# Patient Record
Sex: FEMALE | Employment: FULL TIME | URBAN - METROPOLITAN AREA
[De-identification: names, ages, dates, MRNs, and addresses within clinical notes are randomized per-mention and may not be internally consistent; named-entity substitution may affect disease eponyms.]

---

## 2019-10-05 ENCOUNTER — OFFICE VISIT (OUTPATIENT)
Dept: URGENT CARE | Facility: CLINIC | Age: 59
End: 2019-10-05
Payer: COMMERCIAL

## 2019-10-05 VITALS
WEIGHT: 136.69 LBS | TEMPERATURE: 98.6 F | DIASTOLIC BLOOD PRESSURE: 62 MMHG | SYSTOLIC BLOOD PRESSURE: 136 MMHG | HEIGHT: 60 IN | HEART RATE: 87 BPM | BODY MASS INDEX: 26.84 KG/M2 | OXYGEN SATURATION: 97 % | RESPIRATION RATE: 16 BRPM

## 2019-10-05 DIAGNOSIS — J06.9 VIRAL UPPER RESPIRATORY TRACT INFECTION: Primary | ICD-10-CM

## 2019-10-05 LAB — S PYO AG THROAT QL: NEGATIVE

## 2019-10-05 PROCEDURE — 99203 OFFICE O/P NEW LOW 30 MIN: CPT | Performed by: EMERGENCY MEDICINE

## 2019-10-05 PROCEDURE — 87880 STREP A ASSAY W/OPTIC: CPT | Performed by: EMERGENCY MEDICINE

## 2019-10-05 RX ORDER — PREDNISONE 10 MG/1
TABLET ORAL
Qty: 27 TABLET | Refills: 0 | Status: SHIPPED | OUTPATIENT
Start: 2019-10-05

## 2019-10-05 NOTE — PROGRESS NOTES
[facility]        NAME: David Macedo is a 61 y o  female  : 1960    MRN: [id number]  DATE: 2019  TIME: 10:32 AM    Assessment and Plan   Viral upper respiratory tract infection [J06 9]  1  Viral upper respiratory tract infection  POCT rapid strepA    predniSONE 10 mg tablet         Patient Instructions     Patient Instructions       You have been diagnosed with a Viral Upper Respiratory infection and your symptoms should resolve over the next 7 to 10 days with the treatments recommended today  If they do not, it is possible that you have developed a bacterial infection and you should return  If you were to take an antibiotic while you are still in the viral stage, you will not get better any faster, but could kill off the good germs in your body as well as make the germs in you resistant to the antibiotic  Take an expectorant - guaifenesin should be the only ingredient - during the day, and the cough suppressant (ex  Robitussin DM or Tessalon) if needed at night only  Take Zinc 12 5 to 15 mg every 2 - 3 hrs while awake for the next few days  You may take Cold [place] (13 3 mg of Zinc) or split a 25 mg Zinc tablet or lozenge in two or a 50 mg into four to get the proper dose  The total daily dose of Zinc should exceed 75 mg per day  You may also take a decongestant like Sudafed, unless you have hypertension or cardiac disease  Hold any NSAIDs like Ibuprofen (Advil), Naprosyn (Aleve), etc while on steroids like Medrol or Prednisone  If you are diabetic, you should also adhere strictly to your diet and monitor your blood sugar closely while on the steroids as discussed  Upper Respiratory Infection   AMBULATORY CARE:   An upper respiratory infection  is also called a common cold  It can affect your nose, throat, ears, and sinuses  Common signs and symptoms include the following:  Cold symptoms are usually worst for the first 3 to 5 days   You may have any of the following:  · Runny or stuffy nose    · Sneezing and coughing    · Sore throat or hoarseness    · Red, watery, and sore eyes    · Fatigue     · Chills and fever    · Headache, body aches, or sore muscles  Seek care immediately if:   · You have chest pain or trouble breathing  Contact your healthcare provider if:   · You have a fever over 102ºF (39°C)  · Your sore throat gets worse or you see white or yellow spots in your throat  · Your symptoms get worse after 3 to 5 days or your cold is not better in 14 days  · You have a rash anywhere on your skin  · You have large, tender lumps in your neck  · You have thick, green or yellow drainage from your nose  · You cough up thick yellow, green, or bloody mucus  · You have vomiting for more than 24 hours and cannot keep fluids down  · You have a bad earache  · You have questions or concerns about your condition or care  Treatment for a cold: There is no cure for the common cold  Colds are caused by viruses and do not get better with antibiotics  Most people get better in 7 to 14 days  You may continue to cough for 2 to 3 weeks  The following may help decrease your symptoms:  · Decongestants  help reduce nasal congestion and help you breathe more easily  If you take decongestant pills, they may make you feel restless or not able to sleep  Do not use decongestant sprays for more than a few days  · Cough suppressants  help reduce coughing  Ask your healthcare provider which type of cough medicine is best for you  · NSAIDs , such as ibuprofen, help decrease swelling, pain, and fever  NSAIDs can cause stomach bleeding or kidney problems in certain people  If you take blood thinner medicine, always ask your healthcare provider if NSAIDs are safe for you  Always read the medicine label and follow directions  · Acetaminophen  decreases pain and fever  It is available without a doctor's order  Ask how much to take and how often to take it  Follow directions  Read the labels of all other medicines you are using to see if they also contain acetaminophen, or ask your doctor or pharmacist  Acetaminophen can cause liver damage if not taken correctly  Do not use more than 4 grams (4,000 milligrams) total of acetaminophen in one day  Manage your cold:   · Rest as much as possible  Slowly start to do more each day  · Drink more liquids as directed  Liquids will help thin and loosen mucus so you can cough it up  Liquids will also help prevent dehydration  Liquids that help prevent dehydration include water, fruit juice, and broth  Do not drink liquids that contain caffeine  Caffeine can increase your risk for dehydration  Ask your healthcare provider how much liquid to drink each day  · Soothe a sore throat  Gargle with warm salt water  This helps your sore throat feel better  Make salt water by dissolving ¼ teaspoon salt in 1 cup warm water  You may also suck on hard candy or throat lozenges  You may use a sore throat spray  · Use a humidifier or vaporizer  Use a cool mist humidifier or a vaporizer to increase air moisture in your home  This may make it easier for you to breathe and help decrease your cough  · Use saline nasal drops as directed  These help relieve congestion  · Apply petroleum-based jelly around the outside of your nostrils  This can decrease irritation from blowing your nose  · Do not smoke  Nicotine and other chemicals in cigarettes and cigars can make your symptoms worse  They can also cause infections such as bronchitis or pneumonia  Ask your healthcare provider for information if you currently smoke and need help to quit  E-cigarettes or smokeless tobacco still contain nicotine  Talk to your healthcare provider before you use these products  Prevent spreading your cold to others:   · Try to stay away from other people during the first 2 to 3 days of your cold when it is more easily spread       · Do not share food or drinks  · Do not share hand towels with household members  · Wash your hands often, especially after you blow your nose  Turn away from other people and cover your mouth and nose with a tissue when you sneeze or cough  Follow up with your healthcare provider as directed:  Write down your questions so you remember to ask them during your visits  © 2017 [employer] [employer] Information is for End User's use only and may not be sold, redistributed or otherwise used for commercial purposes  All illustrations and images included in CareNotes® are the copyrighted property of [employer]  or [employer]  The above information is an  only  It is not intended as medical advice for individual conditions or treatments  Talk to your doctor, nurse or pharmacist before following any medical regimen to see if it is safe and effective for you  Follow up with PCP in 3-5 days  Proceed to  ER if symptoms worsen  Chief Complaint     Chief Complaint   Patient presents with    Sore Throat     c/o sore throat approx 1 week, now with head and chest congestin with cough  History of Present Illness       Patient with sore throat, cough and congestion for past 1 week  Review of Systems   Review of Systems   Constitutional: Negative for chills and fever  HENT: Positive for congestion, rhinorrhea, sinus pressure and sore throat  Negative for trouble swallowing and voice change  Respiratory: Positive for cough  Negative for chest tightness, shortness of breath and wheezing  Cardiovascular: Negative for chest pain           Current Medications       Current Outpatient Medications:     predniSONE 10 mg tablet, Take once daily all days pills on this schedule 6- 6- 5- 4- 3- 2- 1, Disp: 27 tablet, Rfl: 0    Current Allergies     Allergies as of 10/05/2019 - Reviewed 10/05/2019   Allergen Reaction Noted    Bentyl [dicyclomine] Swelling 10/05/2019 The following portions of the patient's history were reviewed and updated as appropriate: allergies, current medications, past family history, past medical history, past social history, past surgical history and problem list      Past Medical History:   Diagnosis Date    Known health problems: none        Past Surgical History:   Procedure Laterality Date    CHOLECYSTECTOMY      SKIN CANCER EXCISION      TONSILLECTOMY      TOTAL HIP ARTHROPLASTY         History reviewed  No pertinent family history  Medications have been verified  Objective   /62   Pulse 87   Temp 98 6 °F (37 °C) (Tympanic)   Resp 16   Ht 5' (1 524 m)   Wt 62 kg (136 lb 11 oz)   SpO2 97%   BMI 26 69 kg/m²        Physical Exam     Physical Exam   Constitutional: She is oriented to person, place, and time  She appears well-developed and well-nourished  No distress  HENT:   Head: Normocephalic and atraumatic  Right Ear: Tympanic membrane and external ear normal    Left Ear: Tympanic membrane and external ear normal    Nose: Mucosal edema present  Mouth/Throat: Posterior oropharyngeal erythema present  No oropharyngeal exudate or tonsillar abscesses  Nasal mucosa congested, oropharynx mildly erythematous  Neck: Neck supple  Cardiovascular: Normal rate and regular rhythm  Pulmonary/Chest: Effort normal  No respiratory distress  She has no wheezes  She has no rales  Neurological: She is alert and oriented to person, place, and time  Skin: Skin is warm and dry  Psychiatric: She has a normal mood and affect  Her behavior is normal  Judgment and thought content normal    Nursing note and vitals reviewed  5

## 2019-10-05 NOTE — PATIENT INSTRUCTIONS
You have been diagnosed with a Viral Upper Respiratory infection and your symptoms should resolve over the next 7 to 10 days with the treatments recommended today  If they do not, it is possible that you have developed a bacterial infection and you should return  If you were to take an antibiotic while you are still in the viral stage, you will not get better any faster, but could kill off the good germs in your body as well as make the germs in you resistant to the antibiotic  Take an expectorant - guaifenesin should be the only ingredient - during the day, and the cough suppressant (ex  Robitussin DM or Tessalon) if needed at night only  Take Zinc 12 5 to 15 mg every 2 - 3 hrs while awake for the next few days  You may take Cold [place] (13 3 mg of Zinc) or split a 25 mg Zinc tablet or lozenge in two or a 50 mg into four to get the proper dose  The total daily dose of Zinc should exceed 75 mg per day  You may also take a decongestant like Sudafed, unless you have hypertension or cardiac disease  Hold any NSAIDs like Ibuprofen (Advil), Naprosyn (Aleve), etc while on steroids like Medrol or Prednisone  If you are diabetic, you should also adhere strictly to your diet and monitor your blood sugar closely while on the steroids as discussed  Upper Respiratory Infection   AMBULATORY CARE:   An upper respiratory infection  is also called a common cold  It can affect your nose, throat, ears, and sinuses  Common signs and symptoms include the following:  Cold symptoms are usually worst for the first 3 to 5 days  You may have any of the following:  · Runny or stuffy nose    · Sneezing and coughing    · Sore throat or hoarseness    · Red, watery, and sore eyes    · Fatigue     · Chills and fever    · Headache, body aches, or sore muscles  Seek care immediately if:   · You have chest pain or trouble breathing  Contact your healthcare provider if:   · You have a fever over 102ºF (39°C)      · Your sore throat gets worse or you see white or yellow spots in your throat  · Your symptoms get worse after 3 to 5 days or your cold is not better in 14 days  · You have a rash anywhere on your skin  · You have large, tender lumps in your neck  · You have thick, green or yellow drainage from your nose  · You cough up thick yellow, green, or bloody mucus  · You have vomiting for more than 24 hours and cannot keep fluids down  · You have a bad earache  · You have questions or concerns about your condition or care  Treatment for a cold: There is no cure for the common cold  Colds are caused by viruses and do not get better with antibiotics  Most people get better in 7 to 14 days  You may continue to cough for 2 to 3 weeks  The following may help decrease your symptoms:  · Decongestants  help reduce nasal congestion and help you breathe more easily  If you take decongestant pills, they may make you feel restless or not able to sleep  Do not use decongestant sprays for more than a few days  · Cough suppressants  help reduce coughing  Ask your healthcare provider which type of cough medicine is best for you  · NSAIDs , such as ibuprofen, help decrease swelling, pain, and fever  NSAIDs can cause stomach bleeding or kidney problems in certain people  If you take blood thinner medicine, always ask your healthcare provider if NSAIDs are safe for you  Always read the medicine label and follow directions  · Acetaminophen  decreases pain and fever  It is available without a doctor's order  Ask how much to take and how often to take it  Follow directions  Read the labels of all other medicines you are using to see if they also contain acetaminophen, or ask your doctor or pharmacist  Acetaminophen can cause liver damage if not taken correctly  Do not use more than 4 grams (4,000 milligrams) total of acetaminophen in one day  Manage your cold:   · Rest as much as possible  Slowly start to do more each day  · Drink more liquids as directed  Liquids will help thin and loosen mucus so you can cough it up  Liquids will also help prevent dehydration  Liquids that help prevent dehydration include water, fruit juice, and broth  Do not drink liquids that contain caffeine  Caffeine can increase your risk for dehydration  Ask your healthcare provider how much liquid to drink each day  · Soothe a sore throat  Gargle with warm salt water  This helps your sore throat feel better  Make salt water by dissolving ¼ teaspoon salt in 1 cup warm water  You may also suck on hard candy or throat lozenges  You may use a sore throat spray  · Use a humidifier or vaporizer  Use a cool mist humidifier or a vaporizer to increase air moisture in your home  This may make it easier for you to breathe and help decrease your cough  · Use saline nasal drops as directed  These help relieve congestion  · Apply petroleum-based jelly around the outside of your nostrils  This can decrease irritation from blowing your nose  · Do not smoke  Nicotine and other chemicals in cigarettes and cigars can make your symptoms worse  They can also cause infections such as bronchitis or pneumonia  Ask your healthcare provider for information if you currently smoke and need help to quit  E-cigarettes or smokeless tobacco still contain nicotine  Talk to your healthcare provider before you use these products  Prevent spreading your cold to others:   · Try to stay away from other people during the first 2 to 3 days of your cold when it is more easily spread  · Do not share food or drinks  · Do not share hand towels with household members  · Wash your hands often, especially after you blow your nose  Turn away from other people and cover your mouth and nose with a tissue when you sneeze or cough  Follow up with your healthcare provider as directed:  Write down your questions so you remember to ask them during your visits     © 2017 [facility] Information is for End User's use only and may not be sold, redistributed or otherwise used for commercial purposes  All illustrations and images included in CareNotes® are the copyrighted property of [employer]  or [employer]  The above information is an  only  It is not intended as medical advice for individual conditions or treatments  Talk to your doctor, nurse or pharmacist before following any medical regimen to see if it is safe and effective for you

## 2021-04-13 DIAGNOSIS — Z23 ENCOUNTER FOR IMMUNIZATION: ICD-10-CM

## 2024-04-10 ENCOUNTER — TELEPHONE (OUTPATIENT)
Age: 64
End: 2024-04-10

## 2024-04-10 RX ORDER — NAPROXEN SODIUM 220 MG
220 TABLET ORAL
COMMUNITY
End: 2024-04-11

## 2024-04-10 RX ORDER — IBUPROFEN 200 MG
TABLET ORAL
COMMUNITY

## 2024-04-10 NOTE — TELEPHONE ENCOUNTER
Caller: elio Gregorio    Doctor:     Reason for call: pt is scheduled for an OV on 4/15 pt insurance needs to be updated  [employer]  ID# [id number]    Call back#: [phone]

## 2024-04-15 ENCOUNTER — CONSULT (OUTPATIENT)
Dept: PAIN MEDICINE | Facility: CLINIC | Age: 64
End: 2024-04-15
Payer: COMMERCIAL

## 2024-04-15 ENCOUNTER — HOSPITAL ENCOUNTER (OUTPATIENT)
Dept: RADIOLOGY | Facility: CLINIC | Age: 64
Discharge: HOME | End: 2024-04-15
Payer: COMMERCIAL

## 2024-04-15 VITALS
TEMPERATURE: 97.8 F | HEART RATE: 87 BPM | DIASTOLIC BLOOD PRESSURE: 84 MMHG | HEIGHT: 60 IN | WEIGHT: 137 LBS | RESPIRATION RATE: 18 BRPM | OXYGEN SATURATION: 100 % | SYSTOLIC BLOOD PRESSURE: 138 MMHG | BODY MASS INDEX: 26.9 KG/M2

## 2024-04-15 DIAGNOSIS — M54.12 CERVICAL RADICULOPATHY: ICD-10-CM

## 2024-04-15 DIAGNOSIS — M47.816 LUMBAR SPONDYLOSIS: ICD-10-CM

## 2024-04-15 DIAGNOSIS — M51.16 LUMBAR DISC DISEASE WITH RADICULOPATHY: Primary | ICD-10-CM

## 2024-04-15 DIAGNOSIS — M54.16 LUMBAR RADICULOPATHY: ICD-10-CM

## 2024-04-15 DIAGNOSIS — M51.16 LUMBAR DISC DISEASE WITH RADICULOPATHY: ICD-10-CM

## 2024-04-15 PROCEDURE — 99205 OFFICE O/P NEW HI 60 MIN: CPT | Performed by: ANESTHESIOLOGY

## 2024-04-15 PROCEDURE — 72040 X-RAY EXAM NECK SPINE 2-3 VW: CPT

## 2024-04-15 PROCEDURE — 72100 X-RAY EXAM L-S SPINE 2/3 VWS: CPT

## 2024-04-15 RX ORDER — GABAPENTIN 300 MG/1
300 CAPSULE ORAL 3 TIMES DAILY
Qty: 90 CAPSULE | Refills: 2 | Status: SHIPPED | OUTPATIENT
Start: 2024-04-15

## 2024-04-15 NOTE — PROGRESS NOTES
Assessment  1. Lumbar disc disease with radiculopathy    2. Lumbar radiculopathy    3. Lumbar spondylosis    Left-sided lumbar radicular pain in the L3, L4  dermatomal distribution accompanied by pain limited weakness numbness and paresthesias.  Patient has not yet participated with PT. Chronic pain with decreased participation with IADLs over the past year.  Has been taking OTC ibuprofen and tylenol with modest benefit.  5/5 strength bilaterally, positive SLR left-sided. Reflexes 2+.  Additionally there is positive facet loading, left greater than right. Denies any gait instability, saddle anesthesia. No pertinent imaging available to review at this time.  Risks, benefits alternatives epidural steroid injections thoroughly discussed with patient.  Handouts provided questions answered to patient's satisfaction.  Lifestyle modifications extensively discussed including diet, exercise and weight loss in addition to core strengthening.  Will proceed with multimodal pain therapy plan as noted below:    Left sided cervical radicular pain in C6 and C7 dermatomal distribution accompanied by pain limited weakness numbness and paresthesias.  Patient has not been participant with PT. Chronic pain with decreased participation with IADLs over the past few years.  Has been taking NSAIDs and tramadol infrequently with modest benefit.  5/5 strength bilaterally in upper extremities with AROM, positive spurling's maneuver left sided.  Additionally there is positive cervical facet loading eliciting pain, left greater than right. Negative Diaz's, denies any gait instability, saddle anesthesia. No pertinent imaging available to review at this time. Risks, benefits alternatives to epidural steroid injections thoroughly discussed with patient.  Handouts provided questions answered to patient's satisfaction.  Lifestyle modifications extensively discussed including sleep hygiene, neck posture, diet, exercise and weight loss in  conjunction with PT.  Will proceed with multimodal pain therapy plan as noted below:      Plan  -f/u 6 weeks to consider MRI cervical and lumbar spine  -xray cervical and lumbar spine; will f/u result with patient  -gabapentin 300 mg t.i.d. Ordered for patient; counseled regarding sedative effects of taking this medication and provided up titration calendar.  Counseled not to take medication while driving or operating heavy machinery/using stairs  -ibuprofen prn pain previously prescribed.  Patient educated regarding bleeding risk of taking this medication as well as not taking any other nonsteroidal anti-inflammatory medications while taking this medication; counseled thoroughly regarding potential risk of Cardiovascular injury, Kidney injury, Gastrointestinal ulceration/bleeding. Patient voiced understanding  -script provided for formal physical therapy for left-sided cervical and lumbar radiculopathy; Physician directed home exercise plan as per [facility] demonstrated and handouts provided that patient plans to participate with for 1 hour, twice a week for the next 6 weeks.     There are risks associated with opioid medications, including dependence, addiction and tolerance. The patient understands and agrees to use these medications only as prescribed. Potential side effects of the medications include, but are not limited to, constipation, drowsiness, addiction, impaired judgment and risk of fatal overdose if not taken as prescribed. The patient was warned against driving while taking sedation medications or operating heavy machinery. The patient voiced understanding. Sharing medications is a felony. At this point in time, the patient is showing no signs of addiction, abuse, diversion or suicidal ideation.     [place] Prescription Drug Monitoring Program report was reviewed and was appropriate      Complete risks and benefits including bleeding, infection, tissue reaction, nerve injury and allergic reaction were  discussed. The approach was demonstrated using models and literature was provided. Verbal and written consent was obtained.     My impressions and treatment recommendations were discussed in detail with the patient who verbalized understanding and had no further questions.  Discharge instructions were provided. I personally saw and examined the patient and I agree with the above discussed plan of care.    New Medications Ordered This Visit   Medications    Omega-3 Fatty Acids (Super Omega 3 EPA/DHA) 1000 MG CAPS     Sig: Take by mouth    ibuprofen (MOTRIN) 200 mg tablet     Sig: Advil 200 mg tablet   2 po q 4-6 hours prn pain    Plant Sterols and Stanols (CHOLESTOFF PLUS PO)       History of Present Illness    Darline Rodriguez is a 64 y.o. female with no significant pmhx presenting with presenting with chronic left sided lumbar radicular pain in the L3 and L4 dermatomal distributions. Debilitating pain limited weakness numbness and paresthesias accompany the pain. The patient rates the pain at a 8/10 accompanied by electric shock-like shooting features and crampy burning pain in the aforementioned dermatomal distributions.  The pain is worse in the mornings as well as the end of the day; exertion such as walking for long periods of time seems to exacerbate the pain. The patient can hardly walk more than a few blocks without having debilitating pain.  She tries to maintain an active lifestyle and finds that the current degree of pain seems to compromises her efforts.  The pain significantly impacts independent activities of daily living and contributes to significant disability.  She has not yet attempted physical therapy.  She has taken ibuprofen, tylenol with limited relief of the pain. She has never tried epidural steroid injections in the past. She denies any bowel or bladder dysfunction/incontinence, saddle anesthesia or gait instability.    Additionally with left-sided neck pain described primarily as radicular  nature.  The pain radiates in the C6 and C7 dermatomal distributions and is primarily left-sided.  The pain contributes to significant disability in participation with independent activities of daily living and is accompanied by weakness numbness and paresthesias that are debilitating in nature.  The patient describes that overhead maneuvers such as combing hair is significantly limiting with respect to strength in the left arm/hand. The patient notes significant weakness with left hand  as well. The patient has not been to physical therapy and is now transitioning to physical therapy.  She has trialed conservative measures including nsaids and tylenol for the pain but has not trialed any steroids.  Leighton never had interventional pain procedures in the past including any cervical interlaminar epidural steroid injections in the past for this pain. Denies any gait abnormality, saddle anesthesia or bowel/bladder abnormality.    I have personally reviewed and/or updated the patient's past medical history, past surgical history, family history, social history, current medications, allergies, and vital signs today.     Review of Systems   Constitutional:  Positive for activity change.   HENT: Negative.     Eyes: Negative.    Respiratory: Negative.     Cardiovascular: Negative.    Gastrointestinal: Negative.    Endocrine: Negative.    Genitourinary: Negative.    Musculoskeletal:  Positive for arthralgias, back pain, gait problem, myalgias, neck pain and neck stiffness.   Skin: Negative.    Allergic/Immunologic: Negative.    Neurological:  Positive for tremors and numbness.   Hematological: Negative.    Psychiatric/Behavioral: Negative.     All other systems reviewed and are negative.      There is no problem list on file for this patient.      Past Medical History:   Diagnosis Date    Known health problems: none        Past Surgical History:   Procedure Laterality Date    CHOLECYSTECTOMY      SKIN CANCER EXCISION       TONSILLECTOMY      TOTAL HIP ARTHROPLASTY         History reviewed. No pertinent family history.    Social History     Occupational History    Not on file   Tobacco Use    Smoking status: Never    Smokeless tobacco: Never   Vaping Use    Vaping status: Never Used   Substance and Sexual Activity    Alcohol use: Not Currently    Drug use: Never    Sexual activity: Not on file       Current Outpatient Medications on File Prior to Visit   Medication Sig    ibuprofen (MOTRIN) 200 mg tablet Advil 200 mg tablet   2 po q 4-6 hours prn pain    Omega-3 Fatty Acids (Super Omega 3 EPA/DHA) 1000 MG CAPS Take by mouth    Plant Sterols and Stanols (CHOLESTOFF PLUS PO)      No current facility-administered medications on file prior to visit.       Allergies   Allergen Reactions    Dicyclomine Swelling, Anaphylaxis and Hives    Celecoxib Headache     headdache         Physical Exam    /84 (BP Location: Right arm, Patient Position: Sitting, Cuff Size: Adult)   Pulse 87   Temp 97.8 °F (36.6 °C)   Resp 18   Ht 5' (1.524 m)   Wt 62.1 kg (137 lb)   SpO2 100%   BMI 26.76 kg/m²     Constitutional: normal, well developed, well nourished, alert, in no distress and non-toxic and no overt pain behavior.  Eyes: anicteric  HEENT: grossly intact  Neck: supple, symmetric, trachea midline and no masses   Pulmonary:even and unlabored  Cardiovascular:No edema or pitting edema present  Skin:Normal without rashes or lesions and well hydrated  Psychiatric:Mood and affect appropriate  Neurologic:Cranial Nerves II-XII grossly intact Sensation grossly intact; no clonus negative baldwin's. Reflexes 2+ and brisk. SLR negative bilaterally. Spurling's maneuver negative bilaterally.  Musculoskeletal:normal gait. 5/5 strength bilaterally with AROM in all extremities. Difficulty with normal heel toe and tip toe walking. Significant pain with cervical and lumbar facet loading bilaterally and with lateral spine rotation, left sided. Ttp over cervical  and lumbar paraspinal muscles. Negative gisel's test, negative gaenslen's negative SIJ loading bilaterally.    Imaging    No pertinent imaging available to review at this time.

## 2024-04-15 NOTE — PATIENT INSTRUCTIONS
Neck Exercises   AMBULATORY CARE:   Neck exercises  help reduce neck pain, and improve neck movement and strength. Neck exercises also help prevent long-term neck problems.  Call your doctor if:   Your pain does not get better, or gets worse.    You have questions or concerns about your condition, care, or exercise program.    What you need to know about exercise safety:   Move slowly, gently, and smoothly.  Avoid fast or jerky motions.    Stand and sit the way your healthcare provider shows you.  Good posture may reduce your neck pain. Check your posture often, even when you are not doing your neck exercises.    Follow the exercise program recommended by your healthcare provider.  He or she will tell you which exercises are best for your condition. He or she will also tell you how many repetitions to do and how often you should do the exercises.    How to perform neck exercises safely:   Exercise position:  You may sit or stand while you do neck exercises. Face forward. Your shoulders should be straight and relaxed, with a good posture.         Head tilts, forward and back:  Gently bow your head and try to touch your chin to your chest. Your healthcare provider may tell you to push on the back of your neck to help bow your head. Raise your chin back to the starting position. Tilt your head back as far as possible so you are looking up at the ceiling. Your healthcare provider may tell you to lift your chin to help tilt your head back. Return your head to the starting position.         Head tilts, side to side:  Tilt your head, bringing your ear toward your shoulder. Then tilt your head toward the other shoulder.         Head turns:  Turn your head to look over your shoulder. Tilt your chin down and try to touch it to your shoulder. Do not raise your shoulder to your chin. Face forward again. Do the same on the other side.         Head rolls:  Slowly bring your chin toward your chest. Next, roll your head to the  right. Your ear should be positioned over your shoulder. Hold this position for 5 seconds. Roll your head back toward your chest and to the left into the same position. Hold for 5 seconds. Gently roll your head back and around in a clockwise [place] 3 times. Next, move your head in the reverse direction (counterclockwise) in a [place] 3 times. Do not shrug your shoulders upwards while you do this exercise.       Follow up with your doctor as directed:  Write down your questions so you remember to ask them during your visits.  © Copyright [employer] 2023 Information is for End User's use only and may not be sold, redistributed or otherwise used for commercial purposes.  The above information is an  only. It is not intended as medical advice for individual conditions or treatments. Talk to your doctor, nurse or pharmacist before following any medical regimen to see if it is safe and effective for you.    Core Strengthening Exercises   WHAT YOU NEED TO KNOW:   Your core includes the muscles of your lower back, hip, pelvis, and abdomen. Core strengthening exercises help heal and strengthen these muscles. This helps prevent another injury, and keeps your pelvis, spine, and hips in the correct position.  DISCHARGE INSTRUCTIONS:   Call your doctor or physical therapist if:   You have sharp or worsening pain during exercise or at rest.    You have questions or concerns about your shoulder exercises.    Safety tips:  Talk to your healthcare provider before you start an exercise program. A physical therapist can teach you how to do core strengthening exercises safely.  Do the exercises on a mat or firm surface.  A firm surface will support your spine and prevent low back pain. Do not do these exercises on a bed.    Move slowly and smoothly.  Avoid fast or jerky motions.    Stop if you feel pain.  You may feel some discomfort at first, but you should not feel pain. Tell your provider or physical therapist if you  have pain while you exercise. Regular exercise will help decrease your discomfort over time.    Breathe normally during core exercises.  Do not hold your breath. This may cause an increase in blood pressure and prevent muscle strengthening. Your healthcare provider will tell you when to inhale and exhale during the exercise.    Begin all of your exercises with abdominal bracing.  Abdominal bracing helps warm up your core muscles. You can also practice abdominal bracing throughout the day. Lie on your back with your knees bent and feet flat on the floor. Place your arms in a relaxed position beside your body. Tighten your abdominal muscles. Pull your belly button in and up toward your spine. Hold for 5 seconds. Relax your muscles. Repeat 10 times.       Core strengthening exercises:  Your healthcare provider will tell you how often to do these exercises. The provider will also tell you how many repetitions of each exercise you should do. Hold each exercise for 5 seconds or as directed. As you get stronger, increase your hold to 10 to 15 seconds. You can do some of these exercises on a stability ball, or with a weight. Ask your healthcare provider how to use a stability ball or weight for these exercises:  Bridging:  Lie on your back with your knees bent and feet flat on the floor. Rest your arms at your side. Tighten your buttocks, and then lift your hips 1 inch off the floor. Hold for 5 seconds. When you can do this exercise without pain for 10 seconds, increase the distance you lift your hips. A good goal is to be able to lift your hips so that your shoulders, hips, and knees are in a straight line.         Dead bug:  Lie on your back with your knees bent and feet flat on the floor. Place your arms in a relaxed position beside your body. Begin with abdominal bracing. Next, raise one leg, keeping your knee bent. Hold for 5 seconds. Repeat with the other leg. When you can do this exercise without pain for 10 to 15  seconds, you may raise one straight leg and hold. Repeat with the other leg.         Quadruped:  Place your hands and knees on the floor. Keep your wrists directly below your shoulders and your knees directly below your hips. Pull your belly button in toward your spine. Do not flatten or arch your back. Tighten your abdominal muscles below your belly button. Hold for 5 seconds. When you can do this exercise without pain for 10 to 15 seconds, you may extend one arm and hold. Repeat on the other side.         Side bridge exercises:      Standing side bridge:  Stand next to a wall and extend one arm toward the wall. Place your palm flat on the wall with your fingers pointing upward. Begin with abdominal bracing. Next, without moving your feet, slowly bend your arm to 90 degrees. Hold for 5 seconds. Repeat on the other side. When you can do this exercise without pain for 10 to 15 seconds, you may do the bent leg side bridge on the floor.         Bent leg side bridge:  Lie on one side with your legs, hips, and shoulders in a straight line. Prop yourself up onto your forearm so your elbow is directly below your shoulder. Bend your knees back to 90 degrees. Begin with abdominal bracing. Next, lift your hips and balance yourself on your forearm and knees. Hold for 5 seconds. Repeat on the other side. When you can do this exercise without pain for 10 to 15 seconds, you may do the straight leg side bridge on the floor.         Straight leg side bridge:  Lie on one side with your legs, hips, and shoulders in a straight line. Prop yourself up onto your forearm so your elbow is directly below your shoulder. Begin with abdominal bracing. Lift your hips off the floor and balance yourself on your forearm and the outside of your flexed foot. Do not let your ankle bend sideways. Hold for 5 seconds. Repeat on the other side. When you can do this exercise without pain for 10 to 15 seconds, ask your healthcare provider for more advanced  exercises.       Superman:  Lie on your stomach. Extend your arms forward on the floor. Tighten your abdominal muscles and lift your right hand and left leg off the floor. Hold this position. Slowly return to the starting position. Tighten your abdominal muscles and lift your left hand and right leg off the floor. Hold this position. Slowly return to the starting position.         Clam:  Lie on your side with your knees bent. Put your bottom arm under your head to keep your neck in line. Put your top hand on your hip to keep your pelvis from moving. Put your heels together, and keep them together during this exercise. Slowly raise your top knee toward the ceiling. Then lower your leg so your knees are together. Repeat this exercise 10 times. Then switch sides and do the exercise 10 times with the other leg.         Curl up:  Lie on your back with your knees bent and feet flat on the floor. Place your hands, palms down, underneath your lower back. Next, with your elbows on the floor, lift your shoulders and chest 2 to 3 inches off the floor. Keep your head in line with your shoulders. Hold this position. Slowly return to the starting position.         Straight leg raises:  Lie on your back with one leg straight. Bend the other knee and place your foot flat on the floor. Tighten your abdominal muscles. Keep your leg straight and slowly lift it straight up 6 to 12 inches off the floor. Hold this position. Lower your leg slowly. Do as many repetitions as directed on this side. Repeat with the other leg.         Plank:  Lie on your stomach. Bend your elbows and place your forearms flat on the floor. Lift your chest, stomach, and knees off the floor. Make sure your elbows are below your shoulders. Your body should be in a straight line. Do not let your hips or lower back sink to the ground. Squeeze your abdominal muscles together and hold for 15 seconds. To make this exercise harder, hold for 30 seconds or lift 1 leg at a  time.         Bicycles:  Lie on your back. Bend both knees and bring them toward your chest. Your calves should be parallel to the floor. Place the palms of your hands on the back of your head. Straighten your right leg and keep it lifted 2 inches off the floor. Raise your head and shoulders off the floor and twist towards your left. Keep your head and shoulders lifted. Bend your right knee while you straighten your left leg. Keep your left leg 2 inches off the floor. Twist your head and chest towards the left leg. Continue to straighten 1 leg at a time and twist.       Follow up with your doctor or physical therapist as directed:  Write down your questions so you remember to ask them during your visits.  © Copyright [employer] 2023 Information is for End User's use only and may not be sold, redistributed or otherwise used for commercial purposes.  The above information is an  only. It is not intended as medical advice for individual conditions or treatments. Talk to your doctor, nurse or pharmacist before following any medical regimen to see if it is safe and effective for you.    Gabapentin (By mouth)   Gabapentin ([id number]-PEN-tin)  Treats seizures and pain caused by shingles.   Brand Name(s): FusePaq Fanatrex, Neurontin   There may be other brand names for this medicine.  When This Medicine Should Not Be Used:   This medicine is not right for everyone. Do not use it if you had an allergic reaction to gabapentin.  How to Use This Medicine:   Capsule, Liquid, Tablet  Take your medicine as directed. Your dose may need to be changed several times to find what works best for you. If you have epilepsy, do not allow more than 12 hours to pass between doses.  Capsule: Swallow the capsule whole with plenty of water. Do not open, crush, or chew it.  Gralise® tablet: Swallow the tablet whole . Do not crush, break, or chew it.  Neurontin® tablet: If you break a tablet into 2 pieces, use the second half as your  next dose. Do not use the half-tablet if the whole tablet has been cut or broken after 28 days.  Oral liquid: Measure the oral liquid medicine with a marked measuring spoon, oral syringe, or medicine cup.  This medicine should come with a Medication Guide. Ask your pharmacist for a copy if you do not have one.  Missed dose: Take a dose as soon as you remember. If it is almost time for your next dose, wait until then and take a regular dose. Do not take extra medicine to make up for a missed dose.  Store the medicine in a closed container at room temperature, away from heat, moisture, and direct light. Store the Neurontin® oral liquid in the refrigerator. Do not freeze.  Drugs and Foods to Avoid:   Ask your doctor or pharmacist before using any other medicine, including over-the-counter medicines, vitamins, and herbal products.  Some medicines can affect how gabapentin works. Tell your doctor if you also using hydrocodone or morphine.  If you take an antacid, wait at least 2 hours before you take gabapentin.  Do not drink alcohol while you are using this medicine.  Tell your doctor if you use anything else that makes you sleepy. Some examples are allergy medicine, narcotic pain medicine, and alcohol. Tell your doctor if you are also using lorazepam, oxycodone, or zolpidem.  Warnings While Using This Medicine:   Tell your doctor if you are pregnant or breastfeeding, or if you have kidney problems (including patients receiving dialysis) or lung problems. Tell your doctor if you have a history of depression or mental health problems.  This medicine may cause the following problems:  Drug reaction with eosinophilia and systemic symptoms (DRESS) or multiorgan hypersensitivity, which may damage the liver, kidney, blood, heart, or muscles  Changes in mood or behavior, including suicidal thoughts or behavior  Respiratory depression (serious breathing problem that can be life-threatening), when used with narcotic pain  medicines  Do not stop using this medicine suddenly. Your doctor will need to slowly decrease your dose before you stop it completely.  This medicine may make you dizzy or drowsy. Do not drive or do anything else that could be dangerous until you know how this medicine affects you.  Tell any doctor or dentist who treats you that you are using this medicine. This medicine may affect certain medical test results.  Your doctor will check your progress and the effects of this medicine at regular visits. Keep all appointments.  Keep all medicine out of the reach of children. Never share your medicine with anyone.  Possible Side Effects While Using This Medicine:   Call your doctor right away if you notice any of these side effects:  Allergic reaction: Itching or hives, swelling in your face or hands, swelling or tingling in your mouth or throat, chest tightness, trouble breathing  Behavior problems, aggression, restlessness, trouble concentrating, moodiness (especially in children)  Blistering, peeling, red skin rash  Blue lips, fingernails, or skin, chest pain, fast heartbeat, trouble breathing  Change in how much or how often you urinate, bloody or cloudy urine  Dark urine or pale stools, nausea, vomiting, loss of appetite, stomach pain, yellow skin or eyes  Fever, chills, cough, sore throat, body aches  Problems with coordination, shakiness, unsteadiness, unusual eye movement  Rapid weight gain, swelling in your hands, ankles, or feet  Rash, swollen or tender glands in the neck, armpit, or groin  Unusual moods or behaviors, thoughts of hurting yourself, feeling depressed  If you notice these less serious side effects, talk with your doctor:   Dizziness, drowsiness, sleepiness, tiredness  If you notice other side effects that you think are caused by this medicine, tell your doctor.   Call your doctor for medical advice about side effects. You may report side effects to FDA at 4-725-FYU-1697  © Copyright [employer] 2023  Information is for End User's use only and may not be sold, redistributed or otherwise used for commercial purposes.  The above information is an  only. It is not intended as medical advice for individual conditions or treatments. Talk to your doctor, nurse or pharmacist before following any medical regimen to see if it is safe and effective for you.

## 2024-05-15 ENCOUNTER — TELEPHONE (OUTPATIENT)
Dept: PAIN MEDICINE | Facility: CLINIC | Age: 64
End: 2024-05-15

## 2024-05-15 ENCOUNTER — TELEPHONE (OUTPATIENT)
Dept: OTHER | Facility: HOSPITAL | Age: 64
End: 2024-05-15

## 2024-05-15 DIAGNOSIS — M47.816 LUMBAR SPONDYLOSIS: Primary | ICD-10-CM

## 2024-05-15 DIAGNOSIS — M47.812 CERVICAL SPONDYLOSIS: ICD-10-CM

## 2024-06-10 ENCOUNTER — OFFICE VISIT (OUTPATIENT)
Dept: PAIN MEDICINE | Facility: CLINIC | Age: 64
End: 2024-06-10
Payer: COMMERCIAL

## 2024-06-10 VITALS
WEIGHT: 137 LBS | HEIGHT: 60 IN | DIASTOLIC BLOOD PRESSURE: 78 MMHG | HEART RATE: 82 BPM | OXYGEN SATURATION: 98 % | BODY MASS INDEX: 26.9 KG/M2 | RESPIRATION RATE: 18 BRPM | SYSTOLIC BLOOD PRESSURE: 118 MMHG | TEMPERATURE: 97.3 F

## 2024-06-10 DIAGNOSIS — M54.12 CERVICAL RADICULOPATHY: ICD-10-CM

## 2024-06-10 DIAGNOSIS — M54.16 LUMBAR RADICULOPATHY: Primary | ICD-10-CM

## 2024-06-10 PROCEDURE — 99214 OFFICE O/P EST MOD 30 MIN: CPT | Performed by: ANESTHESIOLOGY

## 2024-06-10 RX ORDER — PREGABALIN 50 MG/1
50 CAPSULE ORAL 3 TIMES DAILY
Qty: 90 CAPSULE | Refills: 2 | Status: SHIPPED | OUTPATIENT
Start: 2024-06-10

## 2024-06-10 NOTE — PATIENT INSTRUCTIONS
Pregabalin (By mouth)   Pregabalin ([id number]-GA-ba-kayla)  Treats nerve and muscle pain, including fibromyalgia. Also treats partial-onset seizures.   Brand Name(s): Lyrica, Lyrica CR   There may be other brand names for this medicine.  When This Medicine Should Not Be Used:   This medicine is not right for everyone. Do not use it if you had an allergic reaction to pregabalin.  How to Use This Medicine:   Capsule, Liquid, Long Acting Tablet  Take your medicine as directed. Your dose may need to be changed several times to find what works best for you.  Extended-release tablet: Swallow the extended-release tablet whole. Do not crush, break, or chew it. Take it after an evening meal.  Oral liquid: Measure the oral liquid medicine with a marked measuring spoon, oral syringe, or medicine cup.  This medicine should come with a Medication Guide. Ask your pharmacist for a copy if you do not have one.  Missed dose: Take a dose as soon as you remember. If it is almost time for your next dose, wait until then and take a regular dose. Do not take extra medicine to make up for a missed dose. If you miss a dose of the extended-release tablet after your evening meal, take it before bedtime after a snack. If you miss the dose before bedtime, take it after your morning meal. If you do not take the dose the following morning, then take the next dose at your regular time after your evening meal. Do not take 2 doses at the same time.  Store the medicine in a closed container at room temperature, away from heat, moisture, and direct light.  Drugs and Foods to Avoid:   Ask your doctor or pharmacist before using any other medicine, including over-the-counter medicines, vitamins, and herbal products.  Some medicines can affect how pregabalin works. Tell your doctor if you are using any of the following:   ACE inhibitor (including benazepril, enalapril, lisinopril, quinapril, ramipril)  Oral diabetes medicine (including metformin, pioglitazone,  rosiglitazone)  Do not drink alcohol while you are using this medicine.  Tell your doctor if you use anything else that makes you sleepy. Some examples are allergy medicine, narcotic pain medicine, and alcohol. Tell your doctor if you are also using oxycodone, lorazepam, or zolpidem.  Warnings While Using This Medicine:   Tell your doctor if you are pregnant or breastfeeding, or if you have kidney disease, heart failure, heart rhythm problems, lung or breathing problems, a bleeding disorder, diabetes, sores or skin problems, or a low blood platelet count. Tell your doctor if you have a history of angioedema (severe swelling), alcohol or drug abuse, depression, or other mood problems.  This medicine may cause the following problems:   Angioedema (severe swelling), which may be life-threatening  Changes in mood or behavior, including suicidal thoughts or behavior  Respiratory depression (serious breathing problem that can be life-threatening), when used with narcotic pain medicines  Peripheral edema (swelling of your hands, ankles, feet, or lower legs)  Increased risk for cancer and bleeding  Serious muscle problems  Heart rhythm changes  This medicine may make you dizzy or drowsy. It may also cause blurry or double vision. Do not drive or do anything else that could be dangerous until you know how this medicine affects you.  Do not stop using this medicine suddenly. Your doctor will need to slowly decrease your dose before you stop it completely.  Your doctor will do lab tests at regular visits to check on the effects of this medicine. Keep all appointments.  Keep all medicine out of the reach of children. Never share your medicine with anyone.  Possible Side Effects While Using This Medicine:   Call your doctor right away if you notice any of these side effects:  Allergic reaction: Itching or hives, swelling in your face or hands, swelling or tingling in your mouth or throat, chest tightness, trouble  breathing  Blistering, peeling, red skin rash  Blue lips, fingernails, or skin, trouble breathing, chest pain  Blurry or double vision  Fever, chills, cough, sore throat, body aches  Muscle pain, tenderness, or weakness, general feeling of illness  Rapid weight gain, swelling in your hands, ankles, or feet  Severe dizziness or drowsiness  Sudden mood changes, unusual moods or behavior, including extreme happiness or depression, thoughts or attempts of killing oneself  Swelling in your throat, head, or neck  Uneven heartbeat  Unusual bleeding, bruising, or weakness  If you notice these less serious side effects, talk with your doctor:   Confusion, trouble concentrating  Constipation  Dry mouth  If you notice other side effects that you think are caused by this medicine, tell your doctor.   Call your doctor for medical advice about side effects. You may report side effects to FDA at 0-708-FDA-9062  © Copyright [employer] 2023 Information is for End User's use only and may not be sold, redistributed or otherwise used for commercial purposes.  The above information is an  only. It is not intended as medical advice for individual conditions or treatments. Talk to your doctor, nurse or pharmacist before following any medical regimen to see if it is safe and effective for you.    Neck Exercises   AMBULATORY CARE:   Neck exercises  help reduce neck pain, and improve neck movement and strength. Neck exercises also help prevent long-term neck problems.  Call your doctor if:   Your pain does not get better, or gets worse.    You have questions or concerns about your condition, care, or exercise program.    What you need to know about exercise safety:   Move slowly, gently, and smoothly.  Avoid fast or jerky motions.    Stand and sit the way your healthcare provider shows you.  Good posture may reduce your neck pain. Check your posture often, even when you are not doing your neck exercises.    Follow the exercise  program recommended by your healthcare provider.  He or she will tell you which exercises are best for your condition. He or she will also tell you how many repetitions to do and how often you should do the exercises.    How to perform neck exercises safely:   Exercise position:  You may sit or stand while you do neck exercises. Face forward. Your shoulders should be straight and relaxed, with a good posture.         Head tilts, forward and back:  Gently bow your head and try to touch your chin to your chest. Your healthcare provider may tell you to push on the back of your neck to help bow your head. Raise your chin back to the starting position. Tilt your head back as far as possible so you are looking up at the ceiling. Your healthcare provider may tell you to lift your chin to help tilt your head back. Return your head to the starting position.         Head tilts, side to side:  Tilt your head, bringing your ear toward your shoulder. Then tilt your head toward the other shoulder.         Head turns:  Turn your head to look over your shoulder. Tilt your chin down and try to touch it to your shoulder. Do not raise your shoulder to your chin. Face forward again. Do the same on the other side.         Head rolls:  Slowly bring your chin toward your chest. Next, roll your head to the right. Your ear should be positioned over your shoulder. Hold this position for 5 seconds. Roll your head back toward your chest and to the left into the same position. Hold for 5 seconds. Gently roll your head back and around in a clockwise [place] 3 times. Next, move your head in the reverse direction (counterclockwise) in a [place] 3 times. Do not shrug your shoulders upwards while you do this exercise.       Follow up with your doctor as directed:  Write down your questions so you remember to ask them during your visits.  © Copyright [employer] 2023 Information is for End User's use only and may not be sold, redistributed or otherwise  used for commercial purposes.  The above information is an  only. It is not intended as medical advice for individual conditions or treatments. Talk to your doctor, nurse or pharmacist before following any medical regimen to see if it is safe and effective for you.  Core Strengthening Exercises   WHAT YOU NEED TO KNOW:   Your core includes the muscles of your lower back, hip, pelvis, and abdomen. Core strengthening exercises help heal and strengthen these muscles. This helps prevent another injury, and keeps your pelvis, spine, and hips in the correct position.  DISCHARGE INSTRUCTIONS:   Call your doctor or physical therapist if:   You have sharp or worsening pain during exercise or at rest.    You have questions or concerns about your shoulder exercises.    Safety tips:  Talk to your healthcare provider before you start an exercise program. A physical therapist can teach you how to do core strengthening exercises safely.  Do the exercises on a mat or firm surface.  A firm surface will support your spine and prevent low back pain. Do not do these exercises on a bed.    Move slowly and smoothly.  Avoid fast or jerky motions.    Stop if you feel pain.  You may feel some discomfort at first, but you should not feel pain. Tell your provider or physical therapist if you have pain while you exercise. Regular exercise will help decrease your discomfort over time.    Breathe normally during core exercises.  Do not hold your breath. This may cause an increase in blood pressure and prevent muscle strengthening. Your healthcare provider will tell you when to inhale and exhale during the exercise.    Begin all of your exercises with abdominal bracing.  Abdominal bracing helps warm up your core muscles. You can also practice abdominal bracing throughout the day. Lie on your back with your knees bent and feet flat on the floor. Place your arms in a relaxed position beside your body. Tighten your abdominal muscles.  Pull your belly button in and up toward your spine. Hold for 5 seconds. Relax your muscles. Repeat 10 times.       Core strengthening exercises:  Your healthcare provider will tell you how often to do these exercises. The provider will also tell you how many repetitions of each exercise you should do. Hold each exercise for 5 seconds or as directed. As you get stronger, increase your hold to 10 to 15 seconds. You can do some of these exercises on a stability ball, or with a weight. Ask your healthcare provider how to use a stability ball or weight for these exercises:  Bridging:  Lie on your back with your knees bent and feet flat on the floor. Rest your arms at your side. Tighten your buttocks, and then lift your hips 1 inch off the floor. Hold for 5 seconds. When you can do this exercise without pain for 10 seconds, increase the distance you lift your hips. A good goal is to be able to lift your hips so that your shoulders, hips, and knees are in a straight line.         Dead bug:  Lie on your back with your knees bent and feet flat on the floor. Place your arms in a relaxed position beside your body. Begin with abdominal bracing. Next, raise one leg, keeping your knee bent. Hold for 5 seconds. Repeat with the other leg. When you can do this exercise without pain for 10 to 15 seconds, you may raise one straight leg and hold. Repeat with the other leg.         Quadruped:  Place your hands and knees on the floor. Keep your wrists directly below your shoulders and your knees directly below your hips. Pull your belly button in toward your spine. Do not flatten or arch your back. Tighten your abdominal muscles below your belly button. Hold for 5 seconds. When you can do this exercise without pain for 10 to 15 seconds, you may extend one arm and hold. Repeat on the other side.         Side bridge exercises:      Standing side bridge:  Stand next to a wall and extend one arm toward the wall. Place your palm flat on the  wall with your fingers pointing upward. Begin with abdominal bracing. Next, without moving your feet, slowly bend your arm to 90 degrees. Hold for 5 seconds. Repeat on the other side. When you can do this exercise without pain for 10 to 15 seconds, you may do the bent leg side bridge on the floor.         Bent leg side bridge:  Lie on one side with your legs, hips, and shoulders in a straight line. Prop yourself up onto your forearm so your elbow is directly below your shoulder. Bend your knees back to 90 degrees. Begin with abdominal bracing. Next, lift your hips and balance yourself on your forearm and knees. Hold for 5 seconds. Repeat on the other side. When you can do this exercise without pain for 10 to 15 seconds, you may do the straight leg side bridge on the floor.         Straight leg side bridge:  Lie on one side with your legs, hips, and shoulders in a straight line. Prop yourself up onto your forearm so your elbow is directly below your shoulder. Begin with abdominal bracing. Lift your hips off the floor and balance yourself on your forearm and the outside of your flexed foot. Do not let your ankle bend sideways. Hold for 5 seconds. Repeat on the other side. When you can do this exercise without pain for 10 to 15 seconds, ask your healthcare provider for more advanced exercises.       Superman:  Lie on your stomach. Extend your arms forward on the floor. Tighten your abdominal muscles and lift your right hand and left leg off the floor. Hold this position. Slowly return to the starting position. Tighten your abdominal muscles and lift your left hand and right leg off the floor. Hold this position. Slowly return to the starting position.         Clam:  Lie on your side with your knees bent. Put your bottom arm under your head to keep your neck in line. Put your top hand on your hip to keep your pelvis from moving. Put your heels together, and keep them together during this exercise. Slowly raise your top  knee toward the ceiling. Then lower your leg so your knees are together. Repeat this exercise 10 times. Then switch sides and do the exercise 10 times with the other leg.         Curl up:  Lie on your back with your knees bent and feet flat on the floor. Place your hands, palms down, underneath your lower back. Next, with your elbows on the floor, lift your shoulders and chest 2 to 3 inches off the floor. Keep your head in line with your shoulders. Hold this position. Slowly return to the starting position.         Straight leg raises:  Lie on your back with one leg straight. Bend the other knee and place your foot flat on the floor. Tighten your abdominal muscles. Keep your leg straight and slowly lift it straight up 6 to 12 inches off the floor. Hold this position. Lower your leg slowly. Do as many repetitions as directed on this side. Repeat with the other leg.         Plank:  Lie on your stomach. Bend your elbows and place your forearms flat on the floor. Lift your chest, stomach, and knees off the floor. Make sure your elbows are below your shoulders. Your body should be in a straight line. Do not let your hips or lower back sink to the ground. Squeeze your abdominal muscles together and hold for 15 seconds. To make this exercise harder, hold for 30 seconds or lift 1 leg at a time.         Bicycles:  Lie on your back. Bend both knees and bring them toward your chest. Your calves should be parallel to the floor. Place the palms of your hands on the back of your head. Straighten your right leg and keep it lifted 2 inches off the floor. Raise your head and shoulders off the floor and twist towards your left. Keep your head and shoulders lifted. Bend your right knee while you straighten your left leg. Keep your left leg 2 inches off the floor. Twist your head and chest towards the left leg. Continue to straighten 1 leg at a time and twist.       Follow up with your doctor or physical therapist as directed:  Write  down your questions so you remember to ask them during your visits.  © Copyright [employer] 2023 Information is for End User's use only and may not be sold, redistributed or otherwise used for commercial purposes.  The above information is an  only. It is not intended as medical advice for individual conditions or treatments. Talk to your doctor, nurse or pharmacist before following any medical regimen to see if it is safe and effective for you.

## 2024-06-10 NOTE — PROGRESS NOTES
Assessment  1. Lumbar radiculopathy  2. Cervical radiculopathy    Xray cervical and lumbar spine reviewed showing overall reversal normal cervical lordosis with degenerative anterolisthesis C4-5 with stepwise retrolisthesis C5-6 and C6-7 and anterolisthesis C7-T1.Advanced disc space narrowing C5-6 and C6-7 with vertebral body endplate spurring. Multilevel disc space narrowing most advanced at L3-4 with vacuum disc phenomenon with multilevel advanced facet arthropathy on xray lumbar spine. Has had considerable benefit with physical therapy, had to stop taking gabapentin secondary to sedation. Continues to report the following:     Left-sided lumbar radicular pain in the L3, L4  dermatomal distribution accompanied by pain limited weakness numbness and paresthesias.  Patient has not yet participated with PT. Chronic pain with decreased participation with IADLs over the past year.  Has been taking OTC ibuprofen and tylenol with modest benefit.  5/5 strength bilaterally, positive SLR left-sided. Reflexes 2+.  Additionally there is positive facet loading, left greater than right. Denies any gait instability, saddle anesthesia. No pertinent imaging available to review at this time.  Risks, benefits alternatives epidural steroid injections thoroughly discussed with patient.  Handouts provided questions answered to patient's satisfaction.  Lifestyle modifications extensively discussed including diet, exercise and weight loss in addition to core strengthening.  Will proceed with multimodal pain therapy plan as noted below:    Left sided cervical radicular pain in C6 and C7 dermatomal distribution accompanied by pain limited weakness numbness and paresthesias.  Patient has not been participant with PT. Chronic pain with decreased participation with IADLs over the past few years.  Has been taking NSAIDs and tramadol infrequently with modest benefit.  5/5 strength bilaterally in upper extremities with AROM, positive  spurling's maneuver left sided.  Additionally there is positive cervical facet loading eliciting pain, left greater than right. Negative Diaz's, denies any gait instability, saddle anesthesia. No pertinent imaging available to review at this time. Risks, benefits alternatives to epidural steroid injections thoroughly discussed with patient.  Handouts provided questions answered to patient's satisfaction.  Lifestyle modifications extensively discussed including sleep hygiene, neck posture, diet, exercise and weight loss in conjunction with PT.  Will proceed with multimodal pain therapy plan as noted below:      Plan  -MRI cervical and lumbar spine; will f/u result with patient  -gabapentin transitioned to lyrica 50 mg t.i.d. for patient; counseled regarding sedative effects of taking this medication and provided up titration calendar.  Counseled not to take medication while driving or operating heavy machinery/using stairs  -ibuprofen prn pain previously prescribed.  Patient educated regarding bleeding risk of taking this medication as well as not taking any other nonsteroidal anti-inflammatory medications while taking this medication; counseled thoroughly regarding potential risk of Cardiovascular injury, Kidney injury, Gastrointestinal ulceration/bleeding. Patient voiced understanding  -has completed formal physical therapy for left-sided cervical and lumbar radiculopathy; Physician directed home exercise plan as per [employer] demonstrated and handouts provided that patient plans to participate with for 1 hour, twice a week for the next 6 weeks.     There are risks associated with opioid medications, including dependence, addiction and tolerance. The patient understands and agrees to use these medications only as prescribed. Potential side effects of the medications include, but are not limited to, constipation, drowsiness, addiction, impaired judgment and risk of fatal overdose if not taken as prescribed. The  patient was warned against driving while taking sedation medications or operating heavy machinery. The patient voiced understanding. Sharing medications is a felony. At this point in time, the patient is showing no signs of addiction, abuse, diversion or suicidal ideation.     [place] Prescription Drug Monitoring Program report was reviewed and was appropriate      Complete risks and benefits including bleeding, infection, tissue reaction, nerve injury and allergic reaction were discussed. The approach was demonstrated using models and literature was provided. Verbal and written consent was obtained.     My impressions and treatment recommendations were discussed in detail with the patient who verbalized understanding and had no further questions.  Discharge instructions were provided. I personally saw and examined the patient and I agree with the above discussed plan of care.    No orders of the defined types were placed in this encounter.      History of Present Illness    Xray cervical and lumbar spine reviewed showing overall reversal normal cervical lordosis with degenerative anterolisthesis C4-5 with stepwise retrolisthesis C5-6 and C6-7 and anterolisthesis C7-T1.Advanced disc space narrowing C5-6 and C6-7 with vertebral body endplate spurring. Multilevel disc space narrowing most advanced at L3-4 with vacuum disc phenomenon with multilevel advanced facet arthropathy on xray lumbar spine. Has had considerable benefit with physical therapy, had to stop taking gabapentin secondary to sedation. Continues to report the following:     Darline Rodriguez is a 64 y.o. female with no significant pmhx presenting with presenting with chronic left sided lumbar radicular pain in the L3 and L4 dermatomal distributions. Debilitating pain limited weakness numbness and paresthesias accompany the pain. The patient rates the pain at a 8/10 accompanied by electric shock-like shooting features and crampy burning pain in the  aforementioned dermatomal distributions.  The pain is worse in the mornings as well as the end of the day; exertion such as walking for long periods of time seems to exacerbate the pain. The patient can hardly walk more than a few blocks without having debilitating pain.  She tries to maintain an active lifestyle and finds that the current degree of pain seems to compromises her efforts.  The pain significantly impacts independent activities of daily living and contributes to significant disability.  She has not yet attempted physical therapy.  She has taken ibuprofen, tylenol with limited relief of the pain. She has never tried epidural steroid injections in the past. She denies any bowel or bladder dysfunction/incontinence, saddle anesthesia or gait instability.    Additionally with left-sided neck pain described primarily as radicular nature.  The pain radiates in the C6 and C7 dermatomal distributions and is primarily left-sided.  The pain contributes to significant disability in participation with independent activities of daily living and is accompanied by weakness numbness and paresthesias that are debilitating in nature.  The patient describes that overhead maneuvers such as combing hair is significantly limiting with respect to strength in the left arm/hand. The patient notes significant weakness with left hand  as well. The patient has not been to physical therapy and is now transitioning to physical therapy.  She has trialed conservative measures including nsaids and tylenol for the pain but has not trialed any steroids.  Leighton never had interventional pain procedures in the past including any cervical interlaminar epidural steroid injections in the past for this pain. Denies any gait abnormality, saddle anesthesia or bowel/bladder abnormality.    I have personally reviewed and/or updated the patient's past medical history, past surgical history, family history, social history, current medications,  allergies, and vital signs today.     Review of Systems   Constitutional:  Positive for activity change.   HENT: Negative.     Eyes: Negative.    Respiratory: Negative.     Cardiovascular: Negative.    Gastrointestinal: Negative.    Endocrine: Negative.    Genitourinary: Negative.    Musculoskeletal:  Positive for arthralgias, back pain, gait problem, myalgias, neck pain and neck stiffness.   Skin: Negative.    Allergic/Immunologic: Negative.    Neurological:  Positive for tremors and numbness.   Hematological: Negative.    Psychiatric/Behavioral: Negative.     All other systems reviewed and are negative.      There is no problem list on file for this patient.      Past Medical History:   Diagnosis Date    Known health problems: none        Past Surgical History:   Procedure Laterality Date    CHOLECYSTECTOMY      SKIN CANCER EXCISION      TONSILLECTOMY      TOTAL HIP ARTHROPLASTY         History reviewed. No pertinent family history.    Social History     Occupational History    Not on file   Tobacco Use    Smoking status: Never    Smokeless tobacco: Never   Vaping Use    Vaping status: Never Used   Substance and Sexual Activity    Alcohol use: Not Currently    Drug use: Never    Sexual activity: Not on file       Current Outpatient Medications on File Prior to Visit   Medication Sig    ibuprofen (MOTRIN) 200 mg tablet Advil 200 mg tablet   2 po q 4-6 hours prn pain    Omega-3 Fatty Acids (Super Omega 3 EPA/DHA) 1000 MG CAPS Take by mouth    Plant Sterols and Stanols (CHOLESTOFF PLUS PO)      No current facility-administered medications on file prior to visit.       Allergies   Allergen Reactions    Dicyclomine Swelling, Anaphylaxis and Hives    Celecoxib Headache     headdache         Physical Exam    /78 (BP Location: Left arm, Patient Position: Sitting, Cuff Size: Adult)   Pulse 82   Temp (!) 97.3 °F (36.3 °C)   Resp 18   Ht 5' (1.524 m)   Wt 62.1 kg (137 lb)   SpO2 98%   BMI 26.76 kg/m²      Constitutional: normal, well developed, well nourished, alert, in no distress and non-toxic and no overt pain behavior.  Eyes: anicteric  HEENT: grossly intact  Neck: supple, symmetric, trachea midline and no masses   Pulmonary:even and unlabored  Cardiovascular:No edema or pitting edema present  Skin:Normal without rashes or lesions and well hydrated  Psychiatric:Mood and affect appropriate  Neurologic:Cranial Nerves II-XII grossly intact Sensation grossly intact; no clonus negative baldwin's. Reflexes 2+ and brisk. SLR negative bilaterally. Spurling's maneuver negative bilaterally.  Musculoskeletal:normal gait. 5/5 strength bilaterally with AROM in all extremities. Difficulty with normal heel toe and tip toe walking. Significant pain with cervical and lumbar facet loading bilaterally and with lateral spine rotation, left sided. Ttp over cervical and lumbar paraspinal muscles. Negative gisel's test, negative gaenslen's negative SIJ loading bilaterally.    Imaging    LUMBAR SPINE     INDICATION:   Intervertebral disc disorders with radiculopathy, lumbar region. Radiculopathy, lumbar region. Spondylosis without myelopathy or radiculopathy, lumbar region.      COMPARISON:  None.     VIEWS:  XR SPINE LUMBAR 2 OR 3 VIEWS INJURY  Images: 3     FINDINGS:     There are 5 non rib bearing lumbar vertebral bodies.      There is no evidence of acute fracture or destructive osseous lesion.     Mild scoliotic deformity is noted.  Alignment is otherwise unremarkable.      Multilevel disc space narrowing most advanced at L3-4 with vacuum disc phenomenon with multilevel advanced facet arthropathy.     The pedicles appear intact.     Soft tissues are unremarkable.     IMPRESSION:        No acute osseous abnormality.       Degenerative changes as described.    CERVICAL SPINE     INDICATION:   Radiculopathy, cervical region.      COMPARISON:  None.     VIEWS:  XR SPINE CERVICAL 2 OR 3 VW INJURY   Images: 3     FINDINGS:     No  acute fracture or subluxation.      Overall reversal normal cervical lordosis with degenerative anterolisthesis C4-5 with stepwise retrolisthesis C5-6 and C6-7 and anterolisthesis C7-T1.     Advanced disc space narrowing C5-6 and C6-7 with vertebral body endplate spurring     Normal prevertebral soft tissues.       Clear lung apices.     IMPRESSION:        No acute osseous abnormality.     Degenerative changes as above.

## 2024-06-20 ENCOUNTER — HOSPITAL ENCOUNTER (OUTPATIENT)
Dept: MRI IMAGING | Facility: HOSPITAL | Age: 64
End: 2024-06-20
Attending: ANESTHESIOLOGY
Payer: COMMERCIAL

## 2024-06-20 DIAGNOSIS — M54.16 LUMBAR RADICULOPATHY: ICD-10-CM

## 2024-06-20 DIAGNOSIS — M54.12 CERVICAL RADICULOPATHY: ICD-10-CM

## 2024-06-20 PROCEDURE — 72141 MRI NECK SPINE W/O DYE: CPT

## 2024-06-20 PROCEDURE — 72148 MRI LUMBAR SPINE W/O DYE: CPT

## 2024-06-24 ENCOUNTER — TELEPHONE (OUTPATIENT)
Age: 64
End: 2024-06-24

## 2024-06-24 NOTE — TELEPHONE ENCOUNTER
Caller: Darline     Doctor:  Tarsha    Reason for call: Patient calling has no gotten MRI results please advise     Call back#: [phone]

## 2024-06-24 NOTE — TELEPHONE ENCOUNTER
Results pending. S/w Pt, advised final results can take 7-10 days. Pt verbalized understanding   MRI completed on 06/20/2024

## 2024-06-26 NOTE — TELEPHONE ENCOUNTER
Results pending   Mercedes Flap Text: The defect edges were debeveled with a #15 scalpel blade. Given the location of the defect, shape of the defect and the proximity to free margins a Mercedes flap was deemed most appropriate. Using a sterile surgical marker, an appropriate advancement flap was drawn incorporating the defect and placing the expected incisions within the relaxed skin tension lines where possible. The area thus outlined was incised deep to adipose tissue with a #15 scalpel blade. The skin margins were undermined to an appropriate distance in all directions utilizing iris scissors. Following this, the designed flap was advanced and carried over into the primary defect and sutured into place.

## 2024-06-28 ENCOUNTER — TELEPHONE (OUTPATIENT)
Dept: PAIN MEDICINE | Facility: CLINIC | Age: 64
End: 2024-06-28

## 2024-06-28 ENCOUNTER — PREP FOR PROCEDURE (OUTPATIENT)
Dept: PAIN MEDICINE | Facility: CLINIC | Age: 64
End: 2024-06-28

## 2024-06-28 DIAGNOSIS — M54.16 LUMBAR RADICULOPATHY: Primary | ICD-10-CM

## 2024-06-28 NOTE — TELEPHONE ENCOUNTER
Caller: Darline    Doctor: Tarsha     Reason for call: MRI results still pending can we inquire what's going on?    Call back#: [phone]

## 2024-06-28 NOTE — TELEPHONE ENCOUNTER
L3-L4: Prominent disc height loss, diffuse disc bulge, left foraminal/extraforaminal disc protrusion posterior osteophytic ridging, and facet arthrosis. Mild spinal canal narrowing. Bilateral subarticular recess narrowing, encroaching the right   descending L4 nerve root. Encroachment on the left extraforaminal L3 nerve root due to disc protrusion     L4-L5: Small diffuse disc bulge, ligamentum flavum thickening, central disc protrusion and facet arthrosis. No central canal narrowing. Mild left greater than right neuroforaminal narrowing.    Above MRI findings discussed with patient via phone message with callback number left for questions; will plan for Left L3 and L4 TFESi to target radicular pain; informed consent to be obtained day of procedure.

## 2024-07-02 ENCOUNTER — TELEPHONE (OUTPATIENT)
Dept: OTHER | Facility: HOSPITAL | Age: 64
End: 2024-07-02

## 2024-07-02 NOTE — QUICK NOTE
C4-C5: There is uncovering the disc space. There is left-sided facet arthrosis. There is severe left foraminal narrowing. There is mild canal stenosis.     C5-C6: There is a disc osteophyte complex with uncovertebral spurring. There is facet arthrosis. There is moderate to severe bilateral foraminal narrowing. There is no significant canal stenosis.     C6-C7: There is a disc osteophyte complex with uncovertebral spurring. There is no significant canal stenosis. There is severe bilateral foraminal narrowing.     Above MRI findings discussed with patient via phone call; will plan for ILESI at C6-C7 or alternate level to target radicular pain; informed consent to be obtained day of procedure.

## 2024-07-11 ENCOUNTER — HOSPITAL ENCOUNTER (OUTPATIENT)
Dept: GASTROENTEROLOGY | Facility: HOSPITAL | Age: 64
Setting detail: OUTPATIENT SURGERY
End: 2024-07-11
Attending: ANESTHESIOLOGY
Payer: COMMERCIAL

## 2024-07-11 VITALS
WEIGHT: 137 LBS | DIASTOLIC BLOOD PRESSURE: 68 MMHG | BODY MASS INDEX: 26.9 KG/M2 | HEART RATE: 76 BPM | OXYGEN SATURATION: 99 % | TEMPERATURE: 97.8 F | HEIGHT: 60 IN | SYSTOLIC BLOOD PRESSURE: 126 MMHG | RESPIRATION RATE: 18 BRPM

## 2024-07-11 DIAGNOSIS — M54.16 LUMBAR RADICULOPATHY: ICD-10-CM

## 2024-07-11 PROCEDURE — 64483 NJX AA&/STRD TFRM EPI L/S 1: CPT | Performed by: ANESTHESIOLOGY

## 2024-07-11 PROCEDURE — 64484 NJX AA&/STRD TFRM EPI L/S EA: CPT | Performed by: ANESTHESIOLOGY

## 2024-07-11 RX ORDER — BETAMETHASONE SODIUM PHOSPHATE AND BETAMETHASONE ACETATE 3; 3 MG/ML; MG/ML
INJECTION, SUSPENSION INTRA-ARTICULAR; INTRALESIONAL; INTRAMUSCULAR; SOFT TISSUE AS NEEDED
Status: COMPLETED | OUTPATIENT
Start: 2024-07-11 | End: 2024-07-11

## 2024-07-11 RX ORDER — LIDOCAINE HYDROCHLORIDE 10 MG/ML
INJECTION, SOLUTION EPIDURAL; INFILTRATION; INTRACAUDAL; PERINEURAL AS NEEDED
Status: COMPLETED | OUTPATIENT
Start: 2024-07-11 | End: 2024-07-11

## 2024-07-11 RX ADMIN — BETAMETHASONE SODIUM PHOSPHATE AND BETAMETHASONE ACETATE 30 MG: 3; 3 INJECTION, SUSPENSION INTRA-ARTICULAR; INTRALESIONAL; INTRAMUSCULAR at 09:47

## 2024-07-11 RX ADMIN — IOHEXOL 2 ML: 240 INJECTION, SOLUTION INTRATHECAL; INTRAVASCULAR; INTRAVENOUS; ORAL at 09:45

## 2024-07-11 RX ADMIN — LIDOCAINE HYDROCHLORIDE 10 ML: 10 INJECTION, SOLUTION EPIDURAL; INFILTRATION; INTRACAUDAL; PERINEURAL at 09:45

## 2024-07-11 NOTE — DISCHARGE INSTR - AVS FIRST PAGE
YOUR 2 WEEK FOLLOW UP HAS BEEN SCHEDULED; IF YOU WISH TO CHANGE THE FOLLOW UP, PLEASE CALL THE [facility] AT [place]: [phone]    Epidural Steroid Injection   WHAT YOU NEED TO KNOW:   An epidural steroid injection (SARAH) is a procedure to inject steroid medicine into the epidural space. The epidural space is between your spinal cord and vertebrae. Steroids reduce inflammation and fluid buildup in your spine that may be causing pain. You may be given pain medicine along with the steroids.        DISCHARGE INSTRUCTIONS:   Call your local emergency number (911 in the [place]) if:   You have a seizure.    You have trouble moving your legs.    Seek care immediately if:   Blood soaks through your bandage.    You have a fever or chills, severe back pain, and the procedure area is sensitive to the touch.    You cannot control when you urinate or have a bowel movement.    Call your doctor if:   You have weakness or numbness in your legs.    Your wound is red, swollen, or draining pus.    You have nausea or are vomiting.    Your face or neck is red and you feel warm.    You have more pain than you had before the procedure.    You have swelling in your hands or feet.    You have questions or concerns about your condition or care.    Care for your wound as directed:  You may remove the bandage before you go to bed the day of your procedure. You may take a shower, but do not take a bath for at least 24 hours.   Self-care:   Do not drive,  use machines, or do strenuous activity for 24 hours after your procedure or as directed.     Continue other treatments  as directed. Steroid injections alone will not control your pain. The injections are meant to be used with other treatments, such as physical therapy.    Follow up with your doctor as directed:  Write down your questions so you remember to ask them during your visits.     EPIDURAL STEROID INJECTION DISCHARGE INSTRUCTIONS      ACTIVITY  Do not drive or operate  machinery today.  No strenuous activity today - bending, lifting, etc.   You may resume normal activities starting tomorrow - start slowly and as tolerated.  You may shower today, but not tub baths or hot tubs.  You may have numbness for several hours from the local anesthetics. Please use caution and common sense, especially with weight-bearing activities.    CARE OF THE INJECTION SITE  If you have soreness or pain apply ice to the area today (20 minutes on and 20 minutes off).  Starting tomorrow, you   Notify the [facility] if you have any of the following: redness, drainage, swelling or fever above 100°F.      MEDICATIONS  Continue to take all routine medications.  Our office may have instructed you to hold some medications. You may restart medications, including blood thinners.

## 2024-07-11 NOTE — H&P
Assessment  1. Lumbar radiculopathy  -     IR spine and pain procedure; Standing  -     IR spine and pain procedure    Xray cervical and lumbar spine reviewed showing overall reversal normal cervical lordosis with degenerative anterolisthesis C4-5 with stepwise retrolisthesis C5-6 and C6-7 and anterolisthesis C7-T1.Advanced disc space narrowing C5-6 and C6-7 with vertebral body endplate spurring. Multilevel disc space narrowing most advanced at L3-4 with vacuum disc phenomenon with multilevel advanced facet arthropathy on xray lumbar spine. Has had considerable benefit with physical therapy, had to stop taking gabapentin secondary to sedation. Continues to report the following:     Left-sided lumbar radicular pain in the L3, L4  dermatomal distribution accompanied by pain limited weakness numbness and paresthesias.  Patient has not yet participated with PT. Chronic pain with decreased participation with IADLs over the past year.  Has been taking OTC ibuprofen and tylenol with modest benefit.  5/5 strength bilaterally, positive SLR left-sided. Reflexes 2+.  Additionally there is positive facet loading, left greater than right. Denies any gait instability, saddle anesthesia. No pertinent imaging available to review at this time.  Risks, benefits alternatives epidural steroid injections thoroughly discussed with patient.  Handouts provided questions answered to patient's satisfaction.  Lifestyle modifications extensively discussed including diet, exercise and weight loss in addition to core strengthening.  Will proceed with multimodal pain therapy plan as noted below:    Left sided cervical radicular pain in C6 and C7 dermatomal distribution accompanied by pain limited weakness numbness and paresthesias.  Patient has not been participant with PT. Chronic pain with decreased participation with IADLs over the past few years.  Has been taking NSAIDs and tramadol infrequently with modest benefit.  5/5 strength  bilaterally in upper extremities with AROM, positive spurling's maneuver left sided.  Additionally there is positive cervical facet loading eliciting pain, left greater than right. Negative Diaz's, denies any gait instability, saddle anesthesia. No pertinent imaging available to review at this time. Risks, benefits alternatives to epidural steroid injections thoroughly discussed with patient.  Handouts provided questions answered to patient's satisfaction.  Lifestyle modifications extensively discussed including sleep hygiene, neck posture, diet, exercise and weight loss in conjunction with PT.  Will proceed with multimodal pain therapy plan as noted below:      Plan  -Left L3 and L4 TFESI; f/u 2 weeks post procedure  -gabapentin transitioned to lyrica 50 mg t.i.d. for patient; counseled regarding sedative effects of taking this medication and provided up titration calendar.  Counseled not to take medication while driving or operating heavy machinery/using stairs  -ibuprofen prn pain previously prescribed.  Patient educated regarding bleeding risk of taking this medication as well as not taking any other nonsteroidal anti-inflammatory medications while taking this medication; counseled thoroughly regarding potential risk of Cardiovascular injury, Kidney injury, Gastrointestinal ulceration/bleeding. Patient voiced understanding  -has completed formal physical therapy for left-sided cervical and lumbar radiculopathy; Physician directed home exercise plan as per [facility] demonstrated and handouts provided that patient plans to participate with for 1 hour, twice a week for the next 6 weeks.     There are risks associated with opioid medications, including dependence, addiction and tolerance. The patient understands and agrees to use these medications only as prescribed. Potential side effects of the medications include, but are not limited to, constipation, drowsiness, addiction, impaired judgment and risk of fatal  overdose if not taken as prescribed. The patient was warned against driving while taking sedation medications or operating heavy machinery. The patient voiced understanding. Sharing medications is a felony. At this point in time, the patient is showing no signs of addiction, abuse, diversion or suicidal ideation.     [place] Prescription Drug Monitoring Program report was reviewed and was appropriate      Complete risks and benefits including bleeding, infection, tissue reaction, nerve injury and allergic reaction were discussed. The approach was demonstrated using models and literature was provided. Verbal and written consent was obtained.     My impressions and treatment recommendations were discussed in detail with the patient who verbalized understanding and had no further questions.  Discharge instructions were provided. I personally saw and examined the patient and I agree with the above discussed plan of care.    No orders of the defined types were placed in this encounter.      History of Present Illness    Xray cervical and lumbar spine reviewed showing overall reversal normal cervical lordosis with degenerative anterolisthesis C4-5 with stepwise retrolisthesis C5-6 and C6-7 and anterolisthesis C7-T1.Advanced disc space narrowing C5-6 and C6-7 with vertebral body endplate spurring. Multilevel disc space narrowing most advanced at L3-4 with vacuum disc phenomenon with multilevel advanced facet arthropathy on xray lumbar spine. Has had considerable benefit with physical therapy, had to stop taking gabapentin secondary to sedation. Continues to report the following:     Darline Rodriguez is a 64 y.o. female with no significant pmhx presenting with presenting with chronic left sided lumbar radicular pain in the L3 and L4 dermatomal distributions. Debilitating pain limited weakness numbness and paresthesias accompany the pain. The patient rates the pain at a 8/10 accompanied by electric shock-like shooting  features and crampy burning pain in the aforementioned dermatomal distributions.  The pain is worse in the mornings as well as the end of the day; exertion such as walking for long periods of time seems to exacerbate the pain. The patient can hardly walk more than a few blocks without having debilitating pain.  She tries to maintain an active lifestyle and finds that the current degree of pain seems to compromises her efforts.  The pain significantly impacts independent activities of daily living and contributes to significant disability.  She has not yet attempted physical therapy.  She has taken ibuprofen, tylenol with limited relief of the pain. She has never tried epidural steroid injections in the past. She denies any bowel or bladder dysfunction/incontinence, saddle anesthesia or gait instability.    Additionally with left-sided neck pain described primarily as radicular nature.  The pain radiates in the C6 and C7 dermatomal distributions and is primarily left-sided.  The pain contributes to significant disability in participation with independent activities of daily living and is accompanied by weakness numbness and paresthesias that are debilitating in nature.  The patient describes that overhead maneuvers such as combing hair is significantly limiting with respect to strength in the left arm/hand. The patient notes significant weakness with left hand  as well. The patient has not been to physical therapy and is now transitioning to physical therapy.  She has trialed conservative measures including nsaids and tylenol for the pain but has not trialed any steroids.  Leighton never had interventional pain procedures in the past including any cervical interlaminar epidural steroid injections in the past for this pain. Denies any gait abnormality, saddle anesthesia or bowel/bladder abnormality.    I have personally reviewed and/or updated the patient's past medical history, past surgical history, family  history, social history, current medications, allergies, and vital signs today.     Review of Systems   Constitutional:  Positive for activity change.   HENT: Negative.     Eyes: Negative.    Respiratory: Negative.     Cardiovascular: Negative.    Gastrointestinal: Negative.    Endocrine: Negative.    Genitourinary: Negative.    Musculoskeletal:  Positive for arthralgias, back pain, gait problem, myalgias, neck pain and neck stiffness.   Skin: Negative.    Allergic/Immunologic: Negative.    Neurological:  Positive for tremors and numbness.   Hematological: Negative.    Psychiatric/Behavioral: Negative.     All other systems reviewed and are negative.      Patient Active Problem List   Diagnosis    Lumbar radiculopathy    Cervical radiculopathy       Past Medical History:   Diagnosis Date    Known health problems: none        Past Surgical History:   Procedure Laterality Date    CHOLECYSTECTOMY      SKIN CANCER EXCISION      TONSILLECTOMY      TOTAL HIP ARTHROPLASTY         History reviewed. No pertinent family history.    Social History     Occupational History    Not on file   Tobacco Use    Smoking status: Never    Smokeless tobacco: Never   Vaping Use    Vaping status: Never Used   Substance and Sexual Activity    Alcohol use: Not Currently    Drug use: Never    Sexual activity: Not on file       Current Outpatient Medications on File Prior to Encounter   Medication Sig    ibuprofen (MOTRIN) 200 mg tablet Advil 200 mg tablet   2 po q 4-6 hours prn pain    Omega-3 Fatty Acids (Super Omega 3 EPA/DHA) 1000 MG CAPS Take by mouth    Plant Sterols and Stanols (CHOLESTOFF PLUS PO)     pregabalin (LYRICA) 50 mg capsule Take 1 capsule (50 mg total) by mouth 3 (three) times a day     No current facility-administered medications on file prior to encounter.       Allergies   Allergen Reactions    Dicyclomine Swelling, Anaphylaxis and Hives    Celecoxib Headache     headdache         Physical Exam    /75   Pulse 72    Temp 98.1 °F (36.7 °C) (Temporal)   Resp 16   Ht 5' (1.524 m)   Wt 62.1 kg (137 lb)   SpO2 98%   BMI 26.76 kg/m²     Constitutional: normal, well developed, well nourished, alert, in no distress and non-toxic and no overt pain behavior.  Eyes: anicteric  HEENT: grossly intact  Neck: supple, symmetric, trachea midline and no masses   Pulmonary:even and unlabored  Cardiovascular:No edema or pitting edema present  Skin:Normal without rashes or lesions and well hydrated  Psychiatric:Mood and affect appropriate  Neurologic:Cranial Nerves II-XII grossly intact Sensation grossly intact; no clonus negative baldwin's. Reflexes 2+ and brisk. SLR negative bilaterally. Spurling's maneuver negative bilaterally.  Musculoskeletal:normal gait. 5/5 strength bilaterally with AROM in all extremities. Difficulty with normal heel toe and tip toe walking. Significant pain with cervical and lumbar facet loading bilaterally and with lateral spine rotation, left sided. Ttp over cervical and lumbar paraspinal muscles. Negative gisel's test, negative gaenslen's negative SIJ loading bilaterally.    Imaging    LUMBAR SPINE     INDICATION:   Intervertebral disc disorders with radiculopathy, lumbar region. Radiculopathy, lumbar region. Spondylosis without myelopathy or radiculopathy, lumbar region.      COMPARISON:  None.     VIEWS:  XR SPINE LUMBAR 2 OR 3 VIEWS INJURY  Images: 3     FINDINGS:     There are 5 non rib bearing lumbar vertebral bodies.      There is no evidence of acute fracture or destructive osseous lesion.     Mild scoliotic deformity is noted.  Alignment is otherwise unremarkable.      Multilevel disc space narrowing most advanced at L3-4 with vacuum disc phenomenon with multilevel advanced facet arthropathy.     The pedicles appear intact.     Soft tissues are unremarkable.     IMPRESSION:        No acute osseous abnormality.       Degenerative changes as described.    CERVICAL SPINE     INDICATION:   Radiculopathy,  cervical region.      COMPARISON:  None.     VIEWS:  XR SPINE CERVICAL 2 OR 3 VW INJURY   Images: 3     FINDINGS:     No acute fracture or subluxation.      Overall reversal normal cervical lordosis with degenerative anterolisthesis C4-5 with stepwise retrolisthesis C5-6 and C6-7 and anterolisthesis C7-T1.     Advanced disc space narrowing C5-6 and C6-7 with vertebral body endplate spurring     Normal prevertebral soft tissues.       Clear lung apices.     IMPRESSION:        No acute osseous abnormality.     Degenerative changes as above.

## 2024-07-12 ENCOUNTER — TELEPHONE (OUTPATIENT)
Age: 64
End: 2024-07-12

## 2024-07-12 NOTE — TELEPHONE ENCOUNTER
S/w the patient and reviewed that she should continue to take the Lyrica until we see how well she does with the LESI.

## 2024-07-12 NOTE — TELEPHONE ENCOUNTER
Caller: Patient     Doctor:      Reason for call: Patient wanting to confirm she should continue to take her Lyrica. She was taking one in the morning and one in the evening.     She had procedure yesterday.     Please advise     Call back#: [phone]

## 2024-08-09 ENCOUNTER — OFFICE VISIT (OUTPATIENT)
Dept: PAIN MEDICINE | Facility: CLINIC | Age: 64
End: 2024-08-09
Payer: COMMERCIAL

## 2024-08-09 ENCOUNTER — PREP FOR PROCEDURE (OUTPATIENT)
Dept: PAIN MEDICINE | Facility: CLINIC | Age: 64
End: 2024-08-09

## 2024-08-09 VITALS
TEMPERATURE: 96 F | WEIGHT: 140 LBS | SYSTOLIC BLOOD PRESSURE: 128 MMHG | HEIGHT: 60 IN | RESPIRATION RATE: 18 BRPM | OXYGEN SATURATION: 97 % | DIASTOLIC BLOOD PRESSURE: 82 MMHG | BODY MASS INDEX: 27.48 KG/M2 | HEART RATE: 85 BPM

## 2024-08-09 DIAGNOSIS — M54.12 CERVICAL RADICULOPATHY: ICD-10-CM

## 2024-08-09 DIAGNOSIS — M54.16 LUMBAR RADICULOPATHY: Primary | ICD-10-CM

## 2024-08-09 PROCEDURE — 99214 OFFICE O/P EST MOD 30 MIN: CPT | Performed by: ANESTHESIOLOGY

## 2024-08-09 NOTE — H&P (VIEW-ONLY)
Assessment  1. Lumbar radiculopathy    Xray cervical and lumbar spine reviewed showing overall reversal normal cervical lordosis with degenerative anterolisthesis C4-5 with stepwise retrolisthesis C5-6 and C6-7 and anterolisthesis C7-T1.Advanced disc space narrowing C5-6 and C6-7 with vertebral body endplate spurring. Spondylosis of the lumbar spine resulting in mild spinal canal narrowing at L3-L4 and multilevel mild to moderate neuroforaminal narrowing most prominent on the left at L4-L5 and L5-S1. Encroachment of the left extraforaminal L3 nerve root due to disc protrusion. lumbar spine. Has had considerable benefit with physical therapy, had to stop taking gabapentin secondary to sedation. Continues to report the following:     Left-sided lumbar radicular pain in the L3, L4  dermatomal distribution accompanied by pain limited weakness numbness and paresthesias.  Patient has not yet participated with PT. Chronic pain with decreased participation with IADLs over the past year.  Has been taking OTC ibuprofen and tylenol with modest benefit.  5/5 strength bilaterally, positive SLR left-sided. Reflexes 2+.  Additionally there is positive facet loading, left greater than right. Denies any gait instability, saddle anesthesia. No pertinent imaging available to review at this time.  Risks, benefits alternatives epidural steroid injections thoroughly discussed with patient.  Handouts provided questions answered to patient's satisfaction.  Lifestyle modifications extensively discussed including diet, exercise and weight loss in addition to core strengthening.  Will proceed with multimodal pain therapy plan as noted below:    Left sided cervical radicular pain in C6 and C7 dermatomal distribution accompanied by pain limited weakness numbness and paresthesias.  Patient has not been participant with PT. Chronic pain with decreased participation with IADLs over the past few years.  Has been taking NSAIDs and tramadol  infrequently with modest benefit.  5/5 strength bilaterally in upper extremities with AROM, positive spurling's maneuver left sided.  Additionally there is positive cervical facet loading eliciting pain, left greater than right. Negative Diaz's, denies any gait instability, saddle anesthesia. No pertinent imaging available to review at this time. Risks, benefits alternatives to epidural steroid injections thoroughly discussed with patient.  Handouts provided questions answered to patient's satisfaction.  Lifestyle modifications extensively discussed including sleep hygiene, neck posture, diet, exercise and weight loss in conjunction with PT.  Will proceed with multimodal pain therapy plan as noted below:      Plan  -ILESI L4-L5; f/u 2 weeks post procedure  -gabapentin transitioned to lyrica 50 mg t.i.d. for patient; will taper given side effects. counseled regarding sedative effects of taking this medication and provided up titration calendar.  Counseled not to take medication while driving or operating heavy machinery/using stairs  -ibuprofen prn pain previously prescribed.  Patient educated regarding bleeding risk of taking this medication as well as not taking any other nonsteroidal anti-inflammatory medications while taking this medication; counseled thoroughly regarding potential risk of Cardiovascular injury, Kidney injury, Gastrointestinal ulceration/bleeding. Patient voiced understanding  -has completed formal physical therapy for left-sided cervical and lumbar radiculopathy; Physician directed home exercise plan as per [facility] demonstrated and handouts provided that patient plans to participate with for 1 hour, twice a week for the next 6 weeks.     There are risks associated with opioid medications, including dependence, addiction and tolerance. The patient understands and agrees to use these medications only as prescribed. Potential side effects of the medications include, but are not limited to,  constipation, drowsiness, addiction, impaired judgment and risk of fatal overdose if not taken as prescribed. The patient was warned against driving while taking sedation medications or operating heavy machinery. The patient voiced understanding. Sharing medications is a felony. At this point in time, the patient is showing no signs of addiction, abuse, diversion or suicidal ideation.     [place] Prescription Drug Monitoring Program report was reviewed and was appropriate      Complete risks and benefits including bleeding, infection, tissue reaction, nerve injury and allergic reaction were discussed. The approach was demonstrated using models and literature was provided. Verbal and written consent was obtained.     My impressions and treatment recommendations were discussed in detail with the patient who verbalized understanding and had no further questions.  Discharge instructions were provided. I personally saw and examined the patient and I agree with the above discussed plan of care.    No orders of the defined types were placed in this encounter.      History of Present Illness    Xray cervical and lumbar spine reviewed showing overall reversal normal cervical lordosis with degenerative anterolisthesis C4-5 with stepwise retrolisthesis C5-6 and C6-7 and anterolisthesis C7-T1.Advanced disc space narrowing C5-6 and C6-7 with vertebral body endplate spurring. Multilevel disc space narrowing most advanced at L3-4 with vacuum disc phenomenon with multilevel advanced facet arthropathy on xray lumbar spine. Has had considerable benefit with physical therapy, had to stop taking gabapentin secondary to sedation. Continues to report the following:     Darline Rodriguez is a 64 y.o. female with no significant pmhx presenting with presenting with chronic left sided lumbar radicular pain in the L3 and L4 dermatomal distributions. Debilitating pain limited weakness numbness and paresthesias accompany the pain. The patient  rates the pain at a 8/10 accompanied by electric shock-like shooting features and crampy burning pain in the aforementioned dermatomal distributions.  The pain is worse in the mornings as well as the end of the day; exertion such as walking for long periods of time seems to exacerbate the pain. The patient can hardly walk more than a few blocks without having debilitating pain.  She tries to maintain an active lifestyle and finds that the current degree of pain seems to compromises her efforts.  The pain significantly impacts independent activities of daily living and contributes to significant disability.  She has not yet attempted physical therapy.  She has taken ibuprofen, tylenol with limited relief of the pain. She has never tried epidural steroid injections in the past. She denies any bowel or bladder dysfunction/incontinence, saddle anesthesia or gait instability.    Additionally with left-sided neck pain described primarily as radicular nature.  The pain radiates in the C6 and C7 dermatomal distributions and is primarily left-sided.  The pain contributes to significant disability in participation with independent activities of daily living and is accompanied by weakness numbness and paresthesias that are debilitating in nature.  The patient describes that overhead maneuvers such as combing hair is significantly limiting with respect to strength in the left arm/hand. The patient notes significant weakness with left hand  as well. The patient has not been to physical therapy and is now transitioning to physical therapy.  She has trialed conservative measures including nsaids and tylenol for the pain but has not trialed any steroids.  Leighton never had interventional pain procedures in the past including any cervical interlaminar epidural steroid injections in the past for this pain. Denies any gait abnormality, saddle anesthesia or bowel/bladder abnormality.    I have personally reviewed and/or updated the  patient's past medical history, past surgical history, family history, social history, current medications, allergies, and vital signs today.     Review of Systems   Constitutional:  Positive for activity change.   HENT: Negative.     Eyes: Negative.    Respiratory: Negative.     Cardiovascular: Negative.    Gastrointestinal: Negative.    Endocrine: Negative.    Genitourinary: Negative.    Musculoskeletal:  Positive for arthralgias, back pain, gait problem, myalgias, neck pain and neck stiffness.   Skin: Negative.    Allergic/Immunologic: Negative.    Neurological:  Positive for tremors and numbness.   Hematological: Negative.    Psychiatric/Behavioral: Negative.     All other systems reviewed and are negative.      Patient Active Problem List   Diagnosis    Lumbar radiculopathy    Cervical radiculopathy       Past Medical History:   Diagnosis Date    Known health problems: none        Past Surgical History:   Procedure Laterality Date    CHOLECYSTECTOMY      IR SPINE AND PAIN PROCEDURE  7/11/2024    SKIN CANCER EXCISION      TONSILLECTOMY      TOTAL HIP ARTHROPLASTY         History reviewed. No pertinent family history.    Social History     Occupational History    Not on file   Tobacco Use    Smoking status: Never    Smokeless tobacco: Never   Vaping Use    Vaping status: Never Used   Substance and Sexual Activity    Alcohol use: Not Currently    Drug use: Never    Sexual activity: Not on file       Current Outpatient Medications on File Prior to Visit   Medication Sig    ibuprofen (MOTRIN) 200 mg tablet Advil 200 mg tablet   2 po q 4-6 hours prn pain    Omega-3 Fatty Acids (Super Omega 3 EPA/DHA) 1000 MG CAPS Take by mouth    Plant Sterols and Stanols (CHOLESTOFF PLUS PO)     pregabalin (LYRICA) 50 mg capsule Take 1 capsule (50 mg total) by mouth 3 (three) times a day (Patient taking differently: Take 50 mg by mouth 2 (two) times a day)     No current facility-administered medications on file prior to visit.        Allergies   Allergen Reactions    Dicyclomine Swelling, Anaphylaxis and Hives    Celecoxib Headache     headdache         Physical Exam    /82 (BP Location: Left arm, Patient Position: Sitting, Cuff Size: Adult)   Pulse 85   Temp (!) 96 °F (35.6 °C)   Resp 18   Ht 5' (1.524 m)   Wt 63.5 kg (140 lb)   SpO2 97%   BMI 27.34 kg/m²     Constitutional: normal, well developed, well nourished, alert, in no distress and non-toxic and no overt pain behavior.  Eyes: anicteric  HEENT: grossly intact  Neck: supple, symmetric, trachea midline and no masses   Pulmonary:even and unlabored  Cardiovascular:No edema or pitting edema present  Skin:Normal without rashes or lesions and well hydrated  Psychiatric:Mood and affect appropriate  Neurologic:Cranial Nerves II-XII grossly intact Sensation grossly intact; no clonus negative baldwin's. Reflexes 2+ and brisk. SLR negative bilaterally. Spurling's maneuver negative bilaterally.  Musculoskeletal:normal gait. 5/5 strength bilaterally with AROM in all extremities. Difficulty with normal heel toe and tip toe walking. Significant pain with cervical and lumbar facet loading bilaterally and with lateral spine rotation, left sided. Ttp over cervical and lumbar paraspinal muscles. Negative gisel's test, negative gaenslen's negative SIJ loading bilaterally.    Imaging  MRI LUMBAR SPINE WITHOUT CONTRAST     INDICATION: M54.16: Radiculopathy, lumbar region.     COMPARISON: Lumbar spine radiographs from 4/15/2024     TECHNIQUE:  Multiplanar, multisequence imaging of the lumbar spine was performed. .        IMAGE QUALITY:  Diagnostic     FINDINGS:     VERTEBRAL BODIES:  There are 5 lumbar type vertebral bodies. Levoscoliosis of the lumbar spine centered at L2-L3. Minimal grade 1 anterolisthesis of L5 on S1. No compression fracture.     Prominent disc height loss at L3-L4 with endplate edema however no associated discal edema or surrounding inflammatory changes is favored to be  degenerative. Osseous hemangioma within the posterior T10 vertebral body. No suspicious osseous lesions.     SACRUM:  Normal signal within the sacrum. No evidence of insufficiency or stress fracture.     DISTAL CORD AND CONUS:  Normal size and signal within the distal cord and conus.     PARASPINAL SOFT TISSUES:  Paraspinal soft tissues are unremarkable.     LOWER THORACIC DISC SPACES:  No canal stenosis or foraminal narrowing.     LUMBAR DISC SPACES:     L1-L2: Small diffuse disc bulge and facet arthrosis. No spinal canal or significant foraminal narrowing.     L2-L3: Diffuse disc bulge, left foraminal disc protrusion and facet arthrosis. Minimal to mild spinal canal narrowing. No foraminal narrowing.     L3-L4: Prominent disc height loss, diffuse disc bulge, left foraminal/extraforaminal disc protrusion posterior osteophytic ridging, and facet arthrosis. Mild spinal canal narrowing. Bilateral subarticular recess narrowing, encroaching the right   descending L4 nerve root. Encroachment on the left extraforaminal L3 nerve root due to disc protrusion     L4-L5: Small diffuse disc bulge, ligamentum flavum thickening, central disc protrusion and facet arthrosis. No central canal narrowing. Mild left greater than right neuroforaminal narrowing.     L5-S1: Grade 1 anterolisthesis, diffuse disc bulge, central disc protrusion and facet arthrosis. Encroachment of the right descending S1 nerve root due to central protrusion. Mild right and mild to moderate left foraminal narrowing.     OTHER FINDINGS: Multiple T2 hyperintense hepatic lesions measuring up to 2.9 cm in the right hepatic lobe is incompletely evaluated.     IMPRESSION:     -Prominent disc height loss and edema within the endplates at L3-L4 is favored to be degenerative as detailed above and less likely infectious. Clinical correlation advised.     -Spondylosis of the lumbar spine resulting in mild spinal canal narrowing at L3-L4 and multilevel mild to moderate  neuroforaminal narrowing most prominent on the left at L4-L5 and L5-S1. Encroachment of the left extraforaminal L3 nerve root due to disc   protrusion.     -Multiple T2 hyperintense hepatic lesions, incompletely evaluated and measuring up to 2.9 cm. Correlate with previous outside ultrasound of the abdomen.    CERVICAL SPINE     INDICATION:   Radiculopathy, cervical region.      COMPARISON:  None.     VIEWS:  XR SPINE CERVICAL 2 OR 3 VW INJURY   Images: 3     FINDINGS:     No acute fracture or subluxation.      Overall reversal normal cervical lordosis with degenerative anterolisthesis C4-5 with stepwise retrolisthesis C5-6 and C6-7 and anterolisthesis C7-T1.     Advanced disc space narrowing C5-6 and C6-7 with vertebral body endplate spurring     Normal prevertebral soft tissues.       Clear lung apices.     IMPRESSION:        No acute osseous abnormality.     Degenerative changes as above.

## 2024-08-09 NOTE — PATIENT INSTRUCTIONS
Patient Education     Core Strengthening Exercises on Back or on Hands and Knees   About this topic   Your core muscles are in your chest, back, buttock, and stomach area. They are your abdominal, back, and pelvis muscles. These muscles help keep your body stable when using your arms or legs. They also help with balance and posture. There are many exercises you can do to keep these muscles strong.  If you have back problems like a compression fracture or a ruptured disc, doing some of these exercises could make your problem worse. Some of these exercises may cause lower back pain.  General   Before starting with a program, ask your doctor if you are healthy enough to do these exercises. Your doctor may have you work with a , chiropractor, or physical therapist to make a safe exercise program to meet your needs.  Strengthening Exercises   Strengthening exercises keep your muscles firm and strong. Start by repeating each exercise 2 to 3 times. Work up to doing each exercise 10 times. Try to do the exercises 2 to 3 times each day. Hold each exercise for 3 to 5 seconds. Do all exercises slowly.  Hip lifts ? Lie on your back with your knees bent and feet flat on the floor. Tighten your stomach muscles and push your heels into the floor to lift your buttocks off the floor. Relax.  Pelvic tilts ? Lie on your back with your knees bent and feet flat on the floor. Tighten your stomach muscles and press your lower back down to the floor. Relax.  Straight leg raises lying down ? Lie on your back with one leg straight. Bend your other knee so the foot is flat on the bed. Keeping your leg straight, lift the leg up to the level of your other knee. Lower it back down. Repeat with the other leg.  Knee flex lying down ? Lie on your back with both knees bent and your feet flat on the floor. Tighten your belly muscles. Raise one leg up and back down as if you are marching in slow motion. Keep belly muscles tight while you move  your leg. Switch legs. To make this exercise harder, raise both arms straight up in the air. Tighten your belly muscles. When you raise one leg up, reach the opposite arm over your head. Switch, moving the opposite arm and leg until you have done 10 repetitions on each side.  Abdominal crunches ? Lie on your back with both knees bent. Keep your feet flat on the floor. Place your hands in one of these positions. Try starting with the first position since it is the easiest. As you get better, use the other positions to make it harder.  Crunches with arms at sides.  Crunches with arms across chest.  Crunches with arms behind head. Be careful not to interlock your fingers behind your neck or head while doing crunches. This may add tension to your neck and cause strain.  Look at the ceiling. Tighten your belly muscles and lift your shoulders and upper back off the floor. Breathe out while you are doing this. Lower your shoulders to the floor. Breathe in while you are doing this. Relax your belly muscles all the way before starting another crunch.  Arm and leg lifts on hands and knees ? Start on your hands and knees. With all of these exercises, keep your back as level as possible. If you are having trouble with this, you may want to put a small object on your back such as a book. If it falls off, you are not keeping your back level enough during the exercise.  Lift one arm up to shoulder level and hold. Lower it back down. Now, lift up the other arm and hold.  Lift one leg up and kick it straight out until it is in line with your back and hold. Lower it back down. Now, lift up the other leg and hold.  Lift one arm and the OPPOSITE leg up at the same time and hold. Lower them down. Now, repeat using the other arm and leg. This is a very hard exercise. It may take time to be able to do this.               What will the results be?   Stronger core  Better balance  More toned belly and back muscles  Easier to do daily  activities  Better sports performance  Less low back pain  Helpful tips   Stay active and work out to keep your muscles strong and flexible.  Keep a healthy weight to avoid putting too much stress on your spine. Eat a healthy diet to keep your muscles healthy.  Be sure you do not hold your breath when exercising. This can raise your blood pressure. If you tend to hold your breath, try counting out loud when exercising. If any exercise bothers you, stop right away.  Try walking or cycling at an easy pace for a few minutes to warm up your muscles. Do this again after exercising.  Exercise may be slightly uncomfortable, but you should not have sharp pains. If you do get sharp pains, stop what you are doing. If the sharp pains continue, call your doctor.  Last Reviewed Date   2021-03-18  Consumer Information Use and Disclaimer   This generalized information is a limited summary of diagnosis, treatment, and/or medication information. It is not meant to be comprehensive and should be used as a tool to help the user understand and/or assess potential diagnostic and treatment options. It does NOT include all information about conditions, treatments, medications, side effects, or risks that may apply to a specific patient. It is not intended to be medical advice or a substitute for the medical advice, diagnosis, or treatment of a health care provider based on the health care provider's examination and assessment of a patient’s specific and unique circumstances. Patients must speak with a health care provider for complete information about their health, medical questions, and treatment options, including any risks or benefits regarding use of medications. This information does not endorse any treatments or medications as safe, effective, or approved for treating a specific patient. [employer] and its affiliates disclaim any warranty or liability relating to this information or the use thereof. The use of this information  is governed by the Terms of Use, available at https://www.[employer].com/en/know/clinical-effectiveness-terms   Copyright   Copyright © 2024 [employer] and its affiliates and/or licensors. All rights reserved.

## 2024-08-20 ENCOUNTER — HOSPITAL ENCOUNTER (OUTPATIENT)
Dept: INTERVENTIONAL RADIOLOGY/VASCULAR | Facility: HOSPITAL | Age: 64
Discharge: HOME | End: 2024-08-20
Attending: ANESTHESIOLOGY
Payer: COMMERCIAL

## 2024-08-20 VITALS
HEART RATE: 69 BPM | TEMPERATURE: 97.5 F | RESPIRATION RATE: 18 BRPM | HEIGHT: 60 IN | OXYGEN SATURATION: 100 % | WEIGHT: 140 LBS | SYSTOLIC BLOOD PRESSURE: 121 MMHG | BODY MASS INDEX: 27.48 KG/M2 | DIASTOLIC BLOOD PRESSURE: 64 MMHG

## 2024-08-20 DIAGNOSIS — M54.16 LUMBAR RADICULOPATHY: ICD-10-CM

## 2024-08-20 PROCEDURE — 62323 NJX INTERLAMINAR LMBR/SAC: CPT | Performed by: ANESTHESIOLOGY

## 2024-08-20 RX ORDER — METHYLPREDNISOLONE ACETATE 80 MG/ML
INJECTION, SUSPENSION INTRA-ARTICULAR; INTRALESIONAL; INTRAMUSCULAR; PARENTERAL; SOFT TISSUE AS NEEDED
Status: COMPLETED | OUTPATIENT
Start: 2024-08-20 | End: 2024-08-20

## 2024-08-20 RX ORDER — LIDOCAINE HYDROCHLORIDE 10 MG/ML
INJECTION, SOLUTION EPIDURAL; INFILTRATION; INTRACAUDAL; PERINEURAL AS NEEDED
Status: COMPLETED | OUTPATIENT
Start: 2024-08-20 | End: 2024-08-20

## 2024-08-20 RX ORDER — 0.9 % SODIUM CHLORIDE 0.9 %
VIAL (ML) INJECTION AS NEEDED
Status: COMPLETED | OUTPATIENT
Start: 2024-08-20 | End: 2024-08-20

## 2024-08-20 RX ADMIN — SODIUM CHLORIDE 3 ML: 9 INJECTION, SOLUTION INTRAMUSCULAR; INTRAVENOUS; SUBCUTANEOUS at 09:51

## 2024-08-20 RX ADMIN — LIDOCAINE HYDROCHLORIDE 5 ML: 10 INJECTION, SOLUTION EPIDURAL; INFILTRATION; INTRACAUDAL; PERINEURAL at 09:44

## 2024-08-20 RX ADMIN — IOHEXOL 1 ML: 240 INJECTION, SOLUTION INTRATHECAL; INTRAVASCULAR; INTRAVENOUS; ORAL at 09:47

## 2024-08-20 RX ADMIN — METHYLPREDNISOLONE ACETATE 80 MG: 80 INJECTION, SUSPENSION INTRA-ARTICULAR; INTRALESIONAL; INTRAMUSCULAR; SOFT TISSUE at 09:51

## 2024-08-20 NOTE — DISCHARGE INSTR - AVS FIRST PAGE
YOUR 2 WEEK FOLLOW UP HAS BEEN SCHEDULED; IF YOU WISH TO CHANGE THE FOLLOW UP, PLEASE CALL THE [facility] AT [place]: [phone]    Epidural Steroid Injection   WHAT YOU NEED TO KNOW:   An epidural steroid injection (SARAH) is a procedure to inject steroid medicine into the epidural space. The epidural space is between your spinal cord and vertebrae. Steroids reduce inflammation and fluid buildup in your spine that may be causing pain. You may be given pain medicine along with the steroids.        DISCHARGE INSTRUCTIONS:   Call your local emergency number (911 in the [place]) if:   You have a seizure.    You have trouble moving your legs.    Seek care immediately if:   Blood soaks through your bandage.    You have a fever or chills, severe back pain, and the procedure area is sensitive to the touch.    You cannot control when you urinate or have a bowel movement.    Call your doctor if:   You have weakness or numbness in your legs.    Your wound is red, swollen, or draining pus.    You have nausea or are vomiting.    Your face or neck is red and you feel warm.    You have more pain than you had before the procedure.    You have swelling in your hands or feet.    You have questions or concerns about your condition or care.    Care for your wound as directed:  You may remove the bandage before you go to bed the day of your procedure. You may take a shower, but do not take a bath for at least 24 hours.   Self-care:   Do not drive,  use machines, or do strenuous activity for 24 hours after your procedure or as directed.     Continue other treatments  as directed. Steroid injections alone will not control your pain. The injections are meant to be used with other treatments, such as physical therapy.    Follow up with your doctor as directed:  Write down your questions so you remember to ask them during your visits.     EPIDURAL STEROID INJECTION DISCHARGE INSTRUCTIONS      ACTIVITY  Do not drive or operate  machinery today.  No strenuous activity today - bending, lifting, etc.   You may resume normal activities starting tomorrow - start slowly and as tolerated.  You may shower today, but not tub baths or hot tubs.  You may have numbness for several hours from the local anesthetics. Please use caution and common sense, especially with weight-bearing activities.    CARE OF THE INJECTION SITE  If you have soreness or pain apply ice to the area today (20 minutes on and 20 minutes off).  Starting tomorrow, you   Notify the [facility] if you have any of the following: redness, drainage, swelling or fever above 100°F.      MEDICATIONS  Continue to take all routine medications.  Our office may have instructed you to hold some medications. You may restart medications, including blood thinners.

## 2024-08-20 NOTE — INTERVAL H&P NOTE
H&P reviewed. After examining the patient I find no changes in the patients condition since the H&P had been written.    Vitals:    08/20/24 0918   BP: 127/67   Pulse: 76   Resp: 20   Temp: 97.6 °F (36.4 °C)   SpO2: 98%

## 2024-09-09 ENCOUNTER — OFFICE VISIT (OUTPATIENT)
Dept: PAIN MEDICINE | Facility: CLINIC | Age: 64
End: 2024-09-09
Payer: COMMERCIAL

## 2024-09-09 VITALS
BODY MASS INDEX: 27.48 KG/M2 | TEMPERATURE: 97.9 F | SYSTOLIC BLOOD PRESSURE: 118 MMHG | WEIGHT: 140 LBS | HEART RATE: 81 BPM | OXYGEN SATURATION: 98 % | RESPIRATION RATE: 18 BRPM | HEIGHT: 60 IN | DIASTOLIC BLOOD PRESSURE: 68 MMHG

## 2024-09-09 DIAGNOSIS — M54.16 LUMBAR RADICULOPATHY: Primary | ICD-10-CM

## 2024-09-09 PROCEDURE — 99213 OFFICE O/P EST LOW 20 MIN: CPT | Performed by: ANESTHESIOLOGY

## 2024-09-09 NOTE — PATIENT INSTRUCTIONS
Patient Education     Core Strengthening Exercises on Back or on Hands and Knees   About this topic   Your core muscles are in your chest, back, buttock, and stomach area. They are your abdominal, back, and pelvis muscles. These muscles help keep your body stable when using your arms or legs. They also help with balance and posture. There are many exercises you can do to keep these muscles strong.  If you have back problems like a compression fracture or a ruptured disc, doing some of these exercises could make your problem worse. Some of these exercises may cause lower back pain.  General   Before starting with a program, ask your doctor if you are healthy enough to do these exercises. Your doctor may have you work with a , chiropractor, or physical therapist to make a safe exercise program to meet your needs.  Strengthening Exercises   Strengthening exercises keep your muscles firm and strong. Start by repeating each exercise 2 to 3 times. Work up to doing each exercise 10 times. Try to do the exercises 2 to 3 times each day. Hold each exercise for 3 to 5 seconds. Do all exercises slowly.  Hip lifts ? Lie on your back with your knees bent and feet flat on the floor. Tighten your stomach muscles and push your heels into the floor to lift your buttocks off the floor. Relax.  Pelvic tilts ? Lie on your back with your knees bent and feet flat on the floor. Tighten your stomach muscles and press your lower back down to the floor. Relax.  Straight leg raises lying down ? Lie on your back with one leg straight. Bend your other knee so the foot is flat on the bed. Keeping your leg straight, lift the leg up to the level of your other knee. Lower it back down. Repeat with the other leg.  Knee flex lying down ? Lie on your back with both knees bent and your feet flat on the floor. Tighten your belly muscles. Raise one leg up and back down as if you are marching in slow motion. Keep belly muscles tight while you move  your leg. Switch legs. To make this exercise harder, raise both arms straight up in the air. Tighten your belly muscles. When you raise one leg up, reach the opposite arm over your head. Switch, moving the opposite arm and leg until you have done 10 repetitions on each side.  Abdominal crunches ? Lie on your back with both knees bent. Keep your feet flat on the floor. Place your hands in one of these positions. Try starting with the first position since it is the easiest. As you get better, use the other positions to make it harder.  Crunches with arms at sides.  Crunches with arms across chest.  Crunches with arms behind head. Be careful not to interlock your fingers behind your neck or head while doing crunches. This may add tension to your neck and cause strain.  Look at the ceiling. Tighten your belly muscles and lift your shoulders and upper back off the floor. Breathe out while you are doing this. Lower your shoulders to the floor. Breathe in while you are doing this. Relax your belly muscles all the way before starting another crunch.  Arm and leg lifts on hands and knees ? Start on your hands and knees. With all of these exercises, keep your back as level as possible. If you are having trouble with this, you may want to put a small object on your back such as a book. If it falls off, you are not keeping your back level enough during the exercise.  Lift one arm up to shoulder level and hold. Lower it back down. Now, lift up the other arm and hold.  Lift one leg up and kick it straight out until it is in line with your back and hold. Lower it back down. Now, lift up the other leg and hold.  Lift one arm and the OPPOSITE leg up at the same time and hold. Lower them down. Now, repeat using the other arm and leg. This is a very hard exercise. It may take time to be able to do this.               What will the results be?   Stronger core  Better balance  More toned belly and back muscles  Easier to do daily  activities  Better sports performance  Less low back pain  Helpful tips   Stay active and work out to keep your muscles strong and flexible.  Keep a healthy weight to avoid putting too much stress on your spine. Eat a healthy diet to keep your muscles healthy.  Be sure you do not hold your breath when exercising. This can raise your blood pressure. If you tend to hold your breath, try counting out loud when exercising. If any exercise bothers you, stop right away.  Try walking or cycling at an easy pace for a few minutes to warm up your muscles. Do this again after exercising.  Exercise may be slightly uncomfortable, but you should not have sharp pains. If you do get sharp pains, stop what you are doing. If the sharp pains continue, call your doctor.  Last Reviewed Date   2021-03-18  Consumer Information Use and Disclaimer   This generalized information is a limited summary of diagnosis, treatment, and/or medication information. It is not meant to be comprehensive and should be used as a tool to help the user understand and/or assess potential diagnostic and treatment options. It does NOT include all information about conditions, treatments, medications, side effects, or risks that may apply to a specific patient. It is not intended to be medical advice or a substitute for the medical advice, diagnosis, or treatment of a health care provider based on the health care provider's examination and assessment of a patient’s specific and unique circumstances. Patients must speak with a health care provider for complete information about their health, medical questions, and treatment options, including any risks or benefits regarding use of medications. This information does not endorse any treatments or medications as safe, effective, or approved for treating a specific patient. [employer] and its affiliates disclaim any warranty or liability relating to this information or the use thereof. The use of this information  is governed by the Terms of Use, available at https://www.woltersRiseHealthuwer.com/en/know/clinical-effectiveness-terms   Copyright   Copyright © 2024 [employer] and its affiliates and/or licensors. All rights reserved.  Patient Education     Denosumab (den [place] mary montoya)   Brand Names: [place] Prolia; Xgeva   Brand Names: [place] Prolia; Xgeva   Warning   Prolia and Jubbonti:   This drug may lower blood calcium levels. If you already have low blood calcium, it may get worse. Low blood calcium must be treated before getting this drug.  People who have some types of kidney problems have a higher risk of very low calcium levels. This includes people who are on dialysis. Sometimes, very low calcium levels have needed treatment in a hospital and have been life-threatening or deadly. A certain health problem called chronic kidney disease-mineral bone disorder (CKD-MBD) may raise the risk of very low calcium levels. Your doctor may test you for CKD-MBD before starting this drug and while you take it.  What is this drug used for?   It is used to treat soft, brittle bones (osteoporosis).  It is used for bone growth.  It is used when treating some cancers.  It is used to treat bone loss in patients getting certain treatments for cancer.  It is used to treat high calcium levels in patients with cancer.  It may be given to you for other reasons. Talk with the doctor.  What do I need to tell my doctor BEFORE I take this drug?   If you are allergic to this drug; any part of this drug; or any other drugs, foods, or substances. Tell your doctor about the allergy and what signs you had.  If you have low calcium levels.  If you are using another drug that has the same drug in it.  If you are pregnant or may be pregnant. Some brands of this drug are not for use during pregnancy.  If you are breast-feeding or plan to breast-feed.  This is not a list of all drugs or health problems that interact with this drug.  Tell your doctor and pharmacist  about all of your drugs (prescription or OTC, natural products, vitamins) and health problems. You must check to make sure that it is safe for you to take this drug with all of your drugs and health problems. Do not start, stop, or change the dose of any drug without checking with your doctor.  What are some things I need to know or do while I take this drug?   All products:   Tell all of your health care providers that you take this drug. This includes your doctors, nurses, pharmacists, and dentists.  This drug may raise the chance of a broken leg. Talk with the doctor.  If treatment with this drug is stopped, skipped, or delayed, the chance of a broken bone is raised. This includes bones in the spine. The chance of having more than 1 broken bone in the spine is raised if you have ever had a broken bone in your spine. Do not stop, skip, or delay treatment with this drug without talking to your doctor.  Have your blood work and bone density checked as you have been told by your doctor.  Take calcium and vitamin D as you were told by your doctor.  Have a dental exam before starting this drug.  Take good care of your teeth. See a dentist often.  This drug may cause harm to an unborn baby. A pregnancy test will be done before you start this drug to show that you are NOT pregnant.  If you may become pregnant, you must use birth control while taking this drug and for some time after the last dose. Ask your doctor how long to use birth control. If you get pregnant, call your doctor right away.  Xgeva and Wyost:   Very low blood calcium levels have happened with this drug. Sometimes, this has been deadly. If you have questions, talk with the doctor.  High calcium levels have happened after this drug was stopped in people whose bones were still growing and people with giant cell bone tumor. Call your doctor right away if you have signs of high calcium levels like weakness, confusion, feeling tired, headache, upset stomach  or throwing up, constipation, or bone pain.  Prolia and Jubbonti:   Very bad infections have been reported with use of this drug. If you have any infection, are taking antibiotics now or in the recent past, or have many infections, talk with your doctor.  Rarely, a pancreas problem (pancreatitis) has happened with this drug. This has included 1 death. Call your doctor right away if you have signs of pancreatitis like very bad stomach pain, very bad back pain, or very bad upset stomach or throwing up.  High cholesterol has happened with this drug. If you have questions, talk with the doctor.  This drug is not approved for use in children. High calcium levels have been reported in some children, which sometimes needed to be treated in the hospital. Bone and teeth growth may also be affected. Talk with the doctor.  What are some side effects that I need to call my doctor about right away?   WARNING/CAUTION: Even though it may be rare, some people may have very bad and sometimes deadly side effects when taking a drug. Tell your doctor or get medical help right away if you have any of the following signs or symptoms that may be related to a very bad side effect:  All products:   Signs of an allergic reaction, like rash; hives; itching; red, swollen, blistered, or peeling skin with or without fever; wheezing; tightness in the chest or throat; trouble breathing, swallowing, or talking; unusual hoarseness; or swelling of the mouth, face, lips, tongue, or throat.  Signs of low calcium levels like muscle cramps or spasms, numbness and tingling, or seizures.  Confusion.  Mouth sores.  Swelling in the arms or legs.  Feeling very tired or weak.  Any new or strange groin, hip, or thigh pain.  Very bad bone, joint, or muscle pain.  Shortness of breath.  This drug may cause jawbone problems. The risk may be higher with longer use, cancer, dental problems, ill-fitting dentures, anemia, blood clotting problems, or infection. It may  also be higher if you have dental work, chemo, radiation, or take other drugs that may cause jawbone problems. Many drugs can do this. Talk with your doctor if any of these apply to you, or if you have questions. Call your doctor right away if you have jaw swelling or pain.  Xgeva and Wyost:   Signs of low phosphate levels like change in eyesight, feeling confused, mood changes, muscle pain or weakness, shortness of breath or other breathing problems, or trouble swallowing.  Severe dizziness or passing out.  Any unexplained bruising or bleeding.  Prolia and Jubbonti:   Signs of infection like fever, chills, very bad sore throat, ear or sinus pain, cough, more sputum or change in color of sputum, pain with passing urine, mouth sores, or wound that will not heal.  Signs of skin infection like oozing, heat, swelling, redness, or pain.  Signs of high or low blood pressure like very bad headache or dizziness, passing out, or change in eyesight.  Chest pain.  Abnormal heartbeat.  Small bumps or patches on your skin, dry skin, or if your skin feels like leather.  Bladder pain or pain when passing urine or change in how much urine is passed.  Passing urine more often.  What are some other side effects of this drug?   All drugs may cause side effects. However, many people have no side effects or only have minor side effects. Call your doctor or get medical help if any of these side effects or any other side effects bother you or do not go away:  All products:   Back, muscle, or joint pain.  Headache.  Signs of a common cold.  Pain in arms or legs.  Xgeva and Wyost:   Constipation, diarrhea, stomach pain, upset stomach, throwing up, or decreased appetite.  Feeling tired or weak.  Nose or throat irritation.  Tooth pain.  These are not all of the side effects that may occur. If you have questions about side effects, call your doctor. Call your doctor for medical advice about side effects.  You may report side effects to your  national health agency.  You may report side effects to the FDA at 1-984.438.3387. You may also report side effects at https://www.fda.gov/medwatch.  How is this drug best taken?   Use this drug as ordered by your doctor. Read all information given to you. Follow all instructions closely.  It is given as a shot into the fatty part of the skin.  What do I do if I miss a dose?   Call your doctor to find out what to do.  How do I store and/or throw out this drug?   If you need to store this drug at home, talk with your doctor, nurse, or pharmacist about how to store it.  General drug facts   If your symptoms or health problems do not get better or if they become worse, call your doctor.  Do not share your drugs with others and do not take anyone else's drugs.  Keep all drugs in a safe place. Keep all drugs out of the reach of children and pets.  Throw away unused or  drugs. Do not flush down a toilet or pour down a drain unless you are told to do so. Check with your pharmacist if you have questions about the best way to throw out drugs. There may be drug take-back programs in your area.  Some drugs may have another patient information leaflet. If you have any questions about this drug, please talk with your doctor, nurse, pharmacist, or other health care provider.  Some drugs may have another patient information leaflet. Check with your pharmacist. If you have any questions about this drug, please talk with your doctor, nurse, pharmacist, or other health care provider.  If you think there has been an overdose, call your poison control center or get medical care right away. Be ready to tell or show what was taken, how much, and when it happened.  Consumer Information Use and Disclaimer   This generalized information is a limited summary of diagnosis, treatment, and/or medication information. It is not meant to be comprehensive and should be used as a tool to help the user understand and/or assess potential  diagnostic and treatment options. It does NOT include all information about conditions, treatments, medications, side effects, or risks that may apply to a specific patient. It is not intended to be medical advice or a substitute for the medical advice, diagnosis, or treatment of a health care provider based on the health care provider's examination and assessment of a patient's specific and unique circumstances. Patients must speak with a health care provider for complete information about their health, medical questions, and treatment options, including any risks or benefits regarding use of medications. This information does not endorse any treatments or medications as safe, effective, or approved for treating a specific patient. [employer] and its affiliates disclaim any warranty or liability relating to this information or the use thereof. The use of this information is governed by the Terms of Use, available at https://www.woltersCollab[facility]inderuwer.com/en/know/clinical-effectiveness-terms.  Last Reviewed Date   2024-03-14  Copyright   © 2024 [employer] and its affiliates and/or licensors. All rights reserved.

## 2024-09-09 NOTE — PROGRESS NOTES
Assessment  1. Lumbar radiculopathy    MRI imaging extensively reviewed with patient. Advanced disc space narrowing C5-6 and C6-7 with vertebral body endplate spurring. Spondylosis of the lumbar spine resulting in mild spinal canal narrowing at L3-L4 and multilevel mild to moderate neuroforaminal narrowing most prominent on the left at L4-L5 and L5-S1. Encroachment of the left extraforaminal L3 nerve root due to disc protrusion. lumbar spine. Has had considerable benefit with physical therapy, had to stop taking gabapentin and lyrica secondary to sedation. Greater than 90% relief of pain with improved ability to participate with IADLs after ILESI at L4-L5 after no significant relief at left L3 and  L4 TFESI. Continues to report the following:     Left-sided lumbar radicular pain in the L3, L4  dermatomal distribution accompanied by pain limited weakness numbness and paresthesias.  Patient has not yet participated with PT. Chronic pain with decreased participation with IADLs over the past year.  Has been taking OTC ibuprofen and tylenol with modest benefit.  5/5 strength bilaterally, positive SLR left-sided. Reflexes 2+.  Additionally there is positive facet loading, left greater than right. Denies any gait instability, saddle anesthesia. No pertinent imaging available to review at this time.  Risks, benefits alternatives epidural steroid injections thoroughly discussed with patient.  Handouts provided questions answered to patient's satisfaction.  Lifestyle modifications extensively discussed including diet, exercise and weight loss in addition to core strengthening.  Will proceed with multimodal pain therapy plan as noted below:    Left sided cervical radicular pain in C6 and C7 dermatomal distribution accompanied by pain limited weakness numbness and paresthesias.  Patient has not been participant with PT. Chronic pain with decreased participation with IADLs over the past few years.  Has been taking NSAIDs and  tramadol infrequently with modest benefit.  5/5 strength bilaterally in upper extremities with AROM, positive spurling's maneuver left sided.  Additionally there is positive cervical facet loading eliciting pain, left greater than right. Negative Diaz's, denies any gait instability, saddle anesthesia. No pertinent imaging available to review at this time. Risks, benefits alternatives to epidural steroid injections thoroughly discussed with patient.  Handouts provided questions answered to patient's satisfaction.  Lifestyle modifications extensively discussed including sleep hygiene, neck posture, diet, exercise and weight loss in conjunction with PT.  Will proceed with multimodal pain therapy plan as noted below:      Plan  -f/u prn  -gabapentin transitioned to lyrica 50 mg t.i.d. for patient; has tapered given side effects. counseled regarding sedative effects of taking this medication and provided up titration calendar.  Counseled not to take medication while driving or operating heavy machinery/using stairs  -ibuprofen prn pain previously prescribed.  Patient educated regarding bleeding risk of taking this medication as well as not taking any other nonsteroidal anti-inflammatory medications while taking this medication; counseled thoroughly regarding potential risk of Cardiovascular injury, Kidney injury, Gastrointestinal ulceration/bleeding. Patient voiced understanding  -has completed formal physical therapy for left-sided cervical and lumbar radiculopathy; Physician directed home exercise plan as per [facility] demonstrated and handouts provided that patient plans to participate with for 1 hour, twice a week for the next 6 weeks.     There are risks associated with opioid medications, including dependence, addiction and tolerance. The patient understands and agrees to use these medications only as prescribed. Potential side effects of the medications include, but are not limited to, constipation, drowsiness,  addiction, impaired judgment and risk of fatal overdose if not taken as prescribed. The patient was warned against driving while taking sedation medications or operating heavy machinery. The patient voiced understanding. Sharing medications is a felony. At this point in time, the patient is showing no signs of addiction, abuse, diversion or suicidal ideation.     [place] Prescription Drug Monitoring Program report was reviewed and was appropriate      Complete risks and benefits including bleeding, infection, tissue reaction, nerve injury and allergic reaction were discussed. The approach was demonstrated using models and literature was provided. Verbal and written consent was obtained.     My impressions and treatment recommendations were discussed in detail with the patient who verbalized understanding and had no further questions.  Discharge instructions were provided. I personally saw and examined the patient and I agree with the above discussed plan of care.    No orders of the defined types were placed in this encounter.      History of Present Illness    Greater than 90% relief of pain with improved ability to participate with IADLs after ILESI at L4-L5 after no significant relief at left L3 and  L4 TFESI. Has had considerable benefit with physical therapy, had to stop taking gabapentin secondary to sedation. Continues to report the following:     Darline Rodriguez is a 64 y.o. female with no significant pmhx presenting with presenting with chronic left sided lumbar radicular pain in the L3 and L4 dermatomal distributions. Debilitating pain limited weakness numbness and paresthesias accompany the pain. The patient rates the pain at a 8/10 accompanied by electric shock-like shooting features and crampy burning pain in the aforementioned dermatomal distributions.  The pain is worse in the mornings as well as the end of the day; exertion such as walking for long periods of time seems to exacerbate the pain. The  patient can hardly walk more than a few blocks without having debilitating pain.  She tries to maintain an active lifestyle and finds that the current degree of pain seems to compromises her efforts.  The pain significantly impacts independent activities of daily living and contributes to significant disability.  She has not yet attempted physical therapy.  She has taken ibuprofen, tylenol with limited relief of the pain. She has never tried epidural steroid injections in the past. She denies any bowel or bladder dysfunction/incontinence, saddle anesthesia or gait instability.    Additionally with left-sided neck pain described primarily as radicular nature.  The pain radiates in the C6 and C7 dermatomal distributions and is primarily left-sided.  The pain contributes to significant disability in participation with independent activities of daily living and is accompanied by weakness numbness and paresthesias that are debilitating in nature.  The patient describes that overhead maneuvers such as combing hair is significantly limiting with respect to strength in the left arm/hand. The patient notes significant weakness with left hand  as well. The patient has not been to physical therapy and is now transitioning to physical therapy.  She has trialed conservative measures including nsaids and tylenol for the pain but has not trialed any steroids.  [place] never had interventional pain procedures in the past including any cervical interlaminar epidural steroid injections in the past for this pain. Denies any gait abnormality, saddle anesthesia or bowel/bladder abnormality.    I have personally reviewed and/or updated the patient's past medical history, past surgical history, family history, social history, current medications, allergies, and vital signs today.     Review of Systems   Constitutional:  Positive for activity change.   HENT: Negative.     Eyes: Negative.    Respiratory: Negative.     Cardiovascular:  Negative.    Gastrointestinal: Negative.    Endocrine: Negative.    Genitourinary: Negative.    Musculoskeletal:  Positive for arthralgias, back pain, gait problem, myalgias, neck pain and neck stiffness.   Skin: Negative.    Allergic/Immunologic: Negative.    Neurological:  Positive for tremors and numbness.   Hematological: Negative.    Psychiatric/Behavioral: Negative.     All other systems reviewed and are negative.      Patient Active Problem List   Diagnosis    Lumbar radiculopathy    Cervical radiculopathy       Past Medical History:   Diagnosis Date    Known health problems: none        Past Surgical History:   Procedure Laterality Date    CHOLECYSTECTOMY      IR SPINE AND PAIN PROCEDURE  7/11/2024    IR SPINE AND PAIN PROCEDURE  8/20/2024    SKIN CANCER EXCISION      TONSILLECTOMY      TOTAL HIP ARTHROPLASTY         History reviewed. No pertinent family history.    Social History     Occupational History    Not on file   Tobacco Use    Smoking status: Never    Smokeless tobacco: Never   Vaping Use    Vaping status: Never Used   Substance and Sexual Activity    Alcohol use: Not Currently    Drug use: Never    Sexual activity: Not on file       Current Outpatient Medications on File Prior to Visit   Medication Sig    ibuprofen (MOTRIN) 200 mg tablet Advil 200 mg tablet   2 po q 4-6 hours prn pain    Omega-3 Fatty Acids (Super Omega 3 EPA/DHA) 1000 MG CAPS Take by mouth    Plant Sterols and Stanols (CHOLESTOFF PLUS PO)      No current facility-administered medications on file prior to visit.       Allergies   Allergen Reactions    Dicyclomine Swelling, Anaphylaxis and Hives    Celecoxib Headache     headdache         Physical Exam    /68 (BP Location: Left arm, Patient Position: Sitting, Cuff Size: Adult)   Pulse 81   Temp 97.9 °F (36.6 °C)   Resp 18   Ht 5' (1.524 m)   Wt 63.5 kg (140 lb)   SpO2 98%   BMI 27.34 kg/m²     Constitutional: normal, well developed, well nourished, alert, in no  distress and non-toxic and no overt pain behavior.  Eyes: anicteric  HEENT: grossly intact  Neck: supple, symmetric, trachea midline and no masses   Pulmonary:even and unlabored  Cardiovascular:No edema or pitting edema present  Skin:Normal without rashes or lesions and well hydrated  Psychiatric:Mood and affect appropriate  Neurologic:Cranial Nerves II-XII grossly intact Sensation grossly intact; no clonus negative baldwin's. Reflexes 2+ and brisk. SLR negative bilaterally. Spurling's maneuver negative bilaterally.  Musculoskeletal:normal gait. 5/5 strength bilaterally with AROM in all extremities. Difficulty with normal heel toe and tip toe walking. Significant pain with cervical and lumbar facet loading bilaterally and with lateral spine rotation, left sided. Ttp over cervical and lumbar paraspinal muscles. Negative gisel's test, negative gaenslen's negative SIJ loading bilaterally.    Imaging  MRI LUMBAR SPINE WITHOUT CONTRAST     INDICATION: M54.16: Radiculopathy, lumbar region.     COMPARISON: Lumbar spine radiographs from 4/15/2024     TECHNIQUE:  Multiplanar, multisequence imaging of the lumbar spine was performed. .        IMAGE QUALITY:  Diagnostic     FINDINGS:     VERTEBRAL BODIES:  There are 5 lumbar type vertebral bodies. Levoscoliosis of the lumbar spine centered at L2-L3. Minimal grade 1 anterolisthesis of L5 on S1. No compression fracture.     Prominent disc height loss at L3-L4 with endplate edema however no associated discal edema or surrounding inflammatory changes is favored to be degenerative. Osseous hemangioma within the posterior T10 vertebral body. No suspicious osseous lesions.     SACRUM:  Normal signal within the sacrum. No evidence of insufficiency or stress fracture.     DISTAL CORD AND CONUS:  Normal size and signal within the distal cord and conus.     PARASPINAL SOFT TISSUES:  Paraspinal soft tissues are unremarkable.     LOWER THORACIC DISC SPACES:  No canal stenosis or foraminal  narrowing.     LUMBAR DISC SPACES:     L1-L2: Small diffuse disc bulge and facet arthrosis. No spinal canal or significant foraminal narrowing.     L2-L3: Diffuse disc bulge, left foraminal disc protrusion and facet arthrosis. Minimal to mild spinal canal narrowing. No foraminal narrowing.     L3-L4: Prominent disc height loss, diffuse disc bulge, left foraminal/extraforaminal disc protrusion posterior osteophytic ridging, and facet arthrosis. Mild spinal canal narrowing. Bilateral subarticular recess narrowing, encroaching the right   descending L4 nerve root. Encroachment on the left extraforaminal L3 nerve root due to disc protrusion     L4-L5: Small diffuse disc bulge, ligamentum flavum thickening, central disc protrusion and facet arthrosis. No central canal narrowing. Mild left greater than right neuroforaminal narrowing.     L5-S1: Grade 1 anterolisthesis, diffuse disc bulge, central disc protrusion and facet arthrosis. Encroachment of the right descending S1 nerve root due to central protrusion. Mild right and mild to moderate left foraminal narrowing.     OTHER FINDINGS: Multiple T2 hyperintense hepatic lesions measuring up to 2.9 cm in the right hepatic lobe is incompletely evaluated.     IMPRESSION:     -Prominent disc height loss and edema within the endplates at L3-L4 is favored to be degenerative as detailed above and less likely infectious. Clinical correlation advised.     -Spondylosis of the lumbar spine resulting in mild spinal canal narrowing at L3-L4 and multilevel mild to moderate neuroforaminal narrowing most prominent on the left at L4-L5 and L5-S1. Encroachment of the left extraforaminal L3 nerve root due to disc   protrusion.     -Multiple T2 hyperintense hepatic lesions, incompletely evaluated and measuring up to 2.9 cm. Correlate with previous outside ultrasound of the abdomen.    CERVICAL SPINE     INDICATION:   Radiculopathy, cervical region.      COMPARISON:  None.     VIEWS:  XR SPINE  CERVICAL 2 OR 3 VW INJURY   Images: 3     FINDINGS:     No acute fracture or subluxation.      Overall reversal normal cervical lordosis with degenerative anterolisthesis C4-5 with stepwise retrolisthesis C5-6 and C6-7 and anterolisthesis C7-T1.     Advanced disc space narrowing C5-6 and C6-7 with vertebral body endplate spurring     Normal prevertebral soft tissues.       Clear lung apices.     IMPRESSION:        No acute osseous abnormality.     Degenerative changes as above.

## 2024-09-21 DIAGNOSIS — Z00.6 ENCOUNTER FOR EXAMINATION FOR NORMAL COMPARISON OR CONTROL IN CLINICAL RESEARCH PROGRAM: ICD-10-CM

## 2024-09-25 ENCOUNTER — APPOINTMENT (OUTPATIENT)
Dept: LAB | Facility: HOSPITAL | Age: 64
End: 2024-09-25

## 2024-09-25 DIAGNOSIS — Z00.6 ENCOUNTER FOR EXAMINATION FOR NORMAL COMPARISON OR CONTROL IN CLINICAL RESEARCH PROGRAM: ICD-10-CM

## 2024-09-25 PROCEDURE — 36415 COLL VENOUS BLD VENIPUNCTURE: CPT

## 2024-10-08 LAB
APOB+LDLR+PCSK9 GENE MUT ANL BLD/T: NOT DETECTED
BRCA1+BRCA2 DEL+DUP + FULL MUT ANL BLD/T: NOT DETECTED
MLH1+MSH2+MSH6+PMS2 GN DEL+DUP+FUL M: NOT DETECTED

## 2024-11-10 ENCOUNTER — PATIENT MESSAGE (OUTPATIENT)
Dept: PAIN MEDICINE | Facility: CLINIC | Age: 64
End: 2024-11-10

## 2024-11-11 ENCOUNTER — PREP FOR PROCEDURE (OUTPATIENT)
Dept: PAIN MEDICINE | Facility: CLINIC | Age: 64
End: 2024-11-11

## 2024-11-11 DIAGNOSIS — M54.16 LUMBAR RADICULOPATHY: Primary | ICD-10-CM

## 2024-11-12 ENCOUNTER — HOSPITAL ENCOUNTER (OUTPATIENT)
Dept: INTERVENTIONAL RADIOLOGY/VASCULAR | Facility: HOSPITAL | Age: 64
Discharge: HOME | End: 2024-11-12
Attending: ANESTHESIOLOGY
Payer: COMMERCIAL

## 2024-11-12 VITALS
WEIGHT: 140 LBS | SYSTOLIC BLOOD PRESSURE: 132 MMHG | HEART RATE: 74 BPM | DIASTOLIC BLOOD PRESSURE: 64 MMHG | HEIGHT: 60 IN | OXYGEN SATURATION: 99 % | TEMPERATURE: 97.6 F | RESPIRATION RATE: 16 BRPM | BODY MASS INDEX: 27.48 KG/M2

## 2024-11-12 DIAGNOSIS — M54.16 LUMBAR RADICULOPATHY: ICD-10-CM

## 2024-11-12 PROCEDURE — 62323 NJX INTERLAMINAR LMBR/SAC: CPT | Performed by: ANESTHESIOLOGY

## 2024-11-12 RX ORDER — LIDOCAINE HYDROCHLORIDE 10 MG/ML
INJECTION, SOLUTION EPIDURAL; INFILTRATION; INTRACAUDAL; PERINEURAL AS NEEDED
Status: COMPLETED | OUTPATIENT
Start: 2024-11-12 | End: 2024-11-12

## 2024-11-12 RX ORDER — 0.9 % SODIUM CHLORIDE 0.9 %
VIAL (ML) INJECTION AS NEEDED
Status: COMPLETED | OUTPATIENT
Start: 2024-11-12 | End: 2024-11-12

## 2024-11-12 RX ORDER — METHYLPREDNISOLONE ACETATE 80 MG/ML
INJECTION, SUSPENSION INTRA-ARTICULAR; INTRALESIONAL; INTRAMUSCULAR; PARENTERAL; SOFT TISSUE AS NEEDED
Status: COMPLETED | OUTPATIENT
Start: 2024-11-12 | End: 2024-11-12

## 2024-11-12 RX ADMIN — METHYLPREDNISOLONE ACETATE 80 MG: 80 INJECTION, SUSPENSION INTRA-ARTICULAR; INTRALESIONAL; INTRAMUSCULAR; SOFT TISSUE at 13:20

## 2024-11-12 RX ADMIN — IOHEXOL 1 ML: 240 INJECTION, SOLUTION INTRATHECAL; INTRAVASCULAR; INTRAVENOUS; ORAL at 13:20

## 2024-11-12 RX ADMIN — SODIUM CHLORIDE 3 ML: 9 INJECTION, SOLUTION INTRAMUSCULAR; INTRAVENOUS; SUBCUTANEOUS at 13:20

## 2024-11-12 RX ADMIN — LIDOCAINE HYDROCHLORIDE 5 ML: 10 INJECTION, SOLUTION EPIDURAL; INFILTRATION; INTRACAUDAL; PERINEURAL at 13:17

## 2024-11-12 NOTE — H&P
Assessment  1. Lumbar radiculopathy  -     IR spine and pain procedure; Standing  -     IR spine and pain procedure    MRI imaging extensively reviewed with patient. Advanced disc space narrowing C5-6 and C6-7 with vertebral body endplate spurring. Spondylosis of the lumbar spine resulting in mild spinal canal narrowing at L3-L4 and multilevel mild to moderate neuroforaminal narrowing most prominent on the left at L4-L5 and L5-S1. Encroachment of the left extraforaminal L3 nerve root due to disc protrusion. lumbar spine. Has had considerable benefit with physical therapy, had to stop taking gabapentin and lyrica secondary to sedation. Greater than 90% relief of pain with improved ability to participate with IADLs after ILESI at L4-L5 after no significant relief at left L3 and  L4 TFESI. Pain relief lasted almost 3 months before returning.  Continues to report the following:     Left-sided lumbar radicular pain in the L3, L4  dermatomal distribution accompanied by pain limited weakness numbness and paresthesias.  Patient has not yet participated with PT. Chronic pain with decreased participation with IADLs over the past year.  Has been taking OTC ibuprofen and tylenol with modest benefit.  5/5 strength bilaterally, positive SLR left-sided. Reflexes 2+.  Additionally there is positive facet loading, left greater than right. Denies any gait instability, saddle anesthesia. No pertinent imaging available to review at this time.  Risks, benefits alternatives epidural steroid injections thoroughly discussed with patient.  Handouts provided questions answered to patient's satisfaction.  Lifestyle modifications extensively discussed including diet, exercise and weight loss in addition to core strengthening.  Will proceed with multimodal pain therapy plan as noted below:    Left sided cervical radicular pain in C6 and C7 dermatomal distribution accompanied by pain limited weakness numbness and paresthesias.  Patient has  not been participant with PT. Chronic pain with decreased participation with IADLs over the past few years.  Has been taking NSAIDs and tramadol infrequently with modest benefit.  5/5 strength bilaterally in upper extremities with AROM, positive spurling's maneuver left sided.  Additionally there is positive cervical facet loading eliciting pain, left greater than right. Negative Diaz's, denies any gait instability, saddle anesthesia. No pertinent imaging available to review at this time. Risks, benefits alternatives to epidural steroid injections thoroughly discussed with patient.  Handouts provided questions answered to patient's satisfaction.  Lifestyle modifications extensively discussed including sleep hygiene, neck posture, diet, exercise and weight loss in conjunction with PT.  Will proceed with multimodal pain therapy plan as noted below:      Plan  -ILESI L4-L5; f/u 2 weeks post procedure  -gabapentin transitioned to lyrica 50 mg t.i.d. for patient; has tapered given side effects. counseled regarding sedative effects of taking this medication and provided up titration calendar.  Counseled not to take medication while driving or operating heavy machinery/using stairs  -ibuprofen prn pain previously prescribed.  Patient educated regarding bleeding risk of taking this medication as well as not taking any other nonsteroidal anti-inflammatory medications while taking this medication; counseled thoroughly regarding potential risk of Cardiovascular injury, Kidney injury, Gastrointestinal ulceration/bleeding. Patient voiced understanding  -has completed formal physical therapy for left-sided cervical and lumbar radiculopathy; Physician directed home exercise plan as per [facility] demonstrated and handouts provided that patient plans to participate with for 1 hour, twice a week for the next 6 weeks.     There are risks associated with opioid medications, including dependence, addiction and tolerance. The patient  understands and agrees to use these medications only as prescribed. Potential side effects of the medications include, but are not limited to, constipation, drowsiness, addiction, impaired judgment and risk of fatal overdose if not taken as prescribed. The patient was warned against driving while taking sedation medications or operating heavy machinery. The patient voiced understanding. Sharing medications is a felony. At this point in time, the patient is showing no signs of addiction, abuse, diversion or suicidal ideation.     [place] Prescription Drug Monitoring Program report was reviewed and was appropriate      Complete risks and benefits including bleeding, infection, tissue reaction, nerve injury and allergic reaction were discussed. The approach was demonstrated using models and literature was provided. Verbal and written consent was obtained.     My impressions and treatment recommendations were discussed in detail with the patient who verbalized understanding and had no further questions.  Discharge instructions were provided. I personally saw and examined the patient and I agree with the above discussed plan of care.    No orders of the defined types were placed in this encounter.      History of Present Illness    Darline Rodriguez is a 64 y.o. female with no significant pmhx presenting with presenting with chronic left sided lumbar radicular pain in the L3 and L4 dermatomal distributions. Debilitating pain limited weakness numbness and paresthesias accompany the pain. The patient rates the pain at a 8/10 accompanied by electric shock-like shooting features and crampy burning pain in the aforementioned dermatomal distributions.  The pain is worse in the mornings as well as the end of the day; exertion such as walking for long periods of time seems to exacerbate the pain. The patient can hardly walk more than a few blocks without having debilitating pain.  She tries to maintain an active lifestyle and  finds that the current degree of pain seems to compromises her efforts.  The pain significantly impacts independent activities of daily living and contributes to significant disability.  She has not yet attempted physical therapy.  She has taken ibuprofen, tylenol with limited relief of the pain. She has never tried epidural steroid injections in the past. She denies any bowel or bladder dysfunction/incontinence, saddle anesthesia or gait instability.    Additionally with left-sided neck pain described primarily as radicular nature.  The pain radiates in the C6 and C7 dermatomal distributions and is primarily left-sided.  The pain contributes to significant disability in participation with independent activities of daily living and is accompanied by weakness numbness and paresthesias that are debilitating in nature.  The patient describes that overhead maneuvers such as combing hair is significantly limiting with respect to strength in the left arm/hand. The patient notes significant weakness with left hand  as well. The patient has not been to physical therapy and is now transitioning to physical therapy.  She has trialed conservative measures including nsaids and tylenol for the pain but has not trialed any steroids.  Leighton never had interventional pain procedures in the past including any cervical interlaminar epidural steroid injections in the past for this pain. Denies any gait abnormality, saddle anesthesia or bowel/bladder abnormality.    I have personally reviewed and/or updated the patient's past medical history, past surgical history, family history, social history, current medications, allergies, and vital signs today.     Review of Systems   Constitutional:  Positive for activity change.   HENT: Negative.     Eyes: Negative.    Respiratory: Negative.     Cardiovascular: Negative.    Gastrointestinal: Negative.    Endocrine: Negative.    Genitourinary: Negative.    Musculoskeletal:  Positive for  arthralgias, back pain, gait problem, myalgias, neck pain and neck stiffness.   Skin: Negative.    Allergic/Immunologic: Negative.    Neurological:  Positive for tremors and numbness.   Hematological: Negative.    Psychiatric/Behavioral: Negative.     All other systems reviewed and are negative.      Patient Active Problem List   Diagnosis    Lumbar radiculopathy    Cervical radiculopathy       Past Medical History:   Diagnosis Date    Known health problems: none        Past Surgical History:   Procedure Laterality Date    CHOLECYSTECTOMY      IR SPINE AND PAIN PROCEDURE  7/11/2024    IR SPINE AND PAIN PROCEDURE  8/20/2024    SKIN CANCER EXCISION      TONSILLECTOMY      TOTAL HIP ARTHROPLASTY         History reviewed. No pertinent family history.    Social History     Occupational History    Not on file   Tobacco Use    Smoking status: Never    Smokeless tobacco: Never   Vaping Use    Vaping status: Never Used   Substance and Sexual Activity    Alcohol use: Not Currently    Drug use: Never    Sexual activity: Not on file       Current Outpatient Medications on File Prior to Encounter   Medication Sig    ibuprofen (MOTRIN) 200 mg tablet Advil 200 mg tablet   2 po q 4-6 hours prn pain    Omega-3 Fatty Acids (Super Omega 3 EPA/DHA) 1000 MG CAPS Take by mouth    Plant Sterols and Stanols (CHOLESTOFF PLUS PO)      No current facility-administered medications on file prior to encounter.       Allergies   Allergen Reactions    Dicyclomine Swelling, Anaphylaxis and Hives    Celecoxib Headache     headdache         Physical Exam    /65   Pulse 87   Temp 98 °F (36.7 °C) (Temporal)   Resp 18   Ht 5' (1.524 m)   Wt 63.5 kg (140 lb)   SpO2 99%   BMI 27.34 kg/m²     Constitutional: normal, well developed, well nourished, alert, in no distress and non-toxic and no overt pain behavior.  Eyes: anicteric  HEENT: grossly intact  Neck: supple, symmetric, trachea midline and no masses   Pulmonary:even and  unlabored  Cardiovascular:No edema or pitting edema present  Skin:Normal without rashes or lesions and well hydrated  Psychiatric:Mood and affect appropriate  Neurologic:Cranial Nerves II-XII grossly intact Sensation grossly intact; no clonus negative baldwin's. Reflexes 2+ and brisk. SLR negative bilaterally. Spurling's maneuver negative bilaterally.  Musculoskeletal:normal gait. 5/5 strength bilaterally with AROM in all extremities. Difficulty with normal heel toe and tip toe walking. Significant pain with cervical and lumbar facet loading bilaterally and with lateral spine rotation, left sided. Ttp over cervical and lumbar paraspinal muscles. Negative gisel's test, negative gaenslen's negative SIJ loading bilaterally.    Imaging  MRI LUMBAR SPINE WITHOUT CONTRAST     INDICATION: M54.16: Radiculopathy, lumbar region.     COMPARISON: Lumbar spine radiographs from 4/15/2024     TECHNIQUE:  Multiplanar, multisequence imaging of the lumbar spine was performed. .        IMAGE QUALITY:  Diagnostic     FINDINGS:     VERTEBRAL BODIES:  There are 5 lumbar type vertebral bodies. Levoscoliosis of the lumbar spine centered at L2-L3. Minimal grade 1 anterolisthesis of L5 on S1. No compression fracture.     Prominent disc height loss at L3-L4 with endplate edema however no associated discal edema or surrounding inflammatory changes is favored to be degenerative. Osseous hemangioma within the posterior T10 vertebral body. No suspicious osseous lesions.     SACRUM:  Normal signal within the sacrum. No evidence of insufficiency or stress fracture.     DISTAL CORD AND CONUS:  Normal size and signal within the distal cord and conus.     PARASPINAL SOFT TISSUES:  Paraspinal soft tissues are unremarkable.     LOWER THORACIC DISC SPACES:  No canal stenosis or foraminal narrowing.     LUMBAR DISC SPACES:     L1-L2: Small diffuse disc bulge and facet arthrosis. No spinal canal or significant foraminal narrowing.     L2-L3: Diffuse disc  bulge, left foraminal disc protrusion and facet arthrosis. Minimal to mild spinal canal narrowing. No foraminal narrowing.     L3-L4: Prominent disc height loss, diffuse disc bulge, left foraminal/extraforaminal disc protrusion posterior osteophytic ridging, and facet arthrosis. Mild spinal canal narrowing. Bilateral subarticular recess narrowing, encroaching the right   descending L4 nerve root. Encroachment on the left extraforaminal L3 nerve root due to disc protrusion     L4-L5: Small diffuse disc bulge, ligamentum flavum thickening, central disc protrusion and facet arthrosis. No central canal narrowing. Mild left greater than right neuroforaminal narrowing.     L5-S1: Grade 1 anterolisthesis, diffuse disc bulge, central disc protrusion and facet arthrosis. Encroachment of the right descending S1 nerve root due to central protrusion. Mild right and mild to moderate left foraminal narrowing.     OTHER FINDINGS: Multiple T2 hyperintense hepatic lesions measuring up to 2.9 cm in the right hepatic lobe is incompletely evaluated.     IMPRESSION:     -Prominent disc height loss and edema within the endplates at L3-L4 is favored to be degenerative as detailed above and less likely infectious. Clinical correlation advised.     -Spondylosis of the lumbar spine resulting in mild spinal canal narrowing at L3-L4 and multilevel mild to moderate neuroforaminal narrowing most prominent on the left at L4-L5 and L5-S1. Encroachment of the left extraforaminal L3 nerve root due to disc   protrusion.     -Multiple T2 hyperintense hepatic lesions, incompletely evaluated and measuring up to 2.9 cm. Correlate with previous outside ultrasound of the abdomen.    CERVICAL SPINE     INDICATION:   Radiculopathy, cervical region.      COMPARISON:  None.     VIEWS:  XR SPINE CERVICAL 2 OR 3 VW INJURY   Images: 3     FINDINGS:     No acute fracture or subluxation.      Overall reversal normal cervical lordosis with degenerative  anterolisthesis C4-5 with stepwise retrolisthesis C5-6 and C6-7 and anterolisthesis C7-T1.     Advanced disc space narrowing C5-6 and C6-7 with vertebral body endplate spurring     Normal prevertebral soft tissues.       Clear lung apices.     IMPRESSION:        No acute osseous abnormality.     Degenerative changes as above.

## 2024-11-12 NOTE — DISCHARGE INSTR - AVS FIRST PAGE
YOUR 2 WEEK FOLLOW UP HAS BEEN SCHEDULED; IF YOU WISH TO CHANGE THE FOLLOW UP, PLEASE CALL THE [facility] AT [place]: [phone]    EPIDURAL STEROID INJECTION DISCHARGE INSTRUCTIONS  WHAT YOU NEED TO KNOW:   An epidural steroid injection (SARAH) is a procedure to inject steroid medicine into the epidural space. The epidural space is between your spinal cord and vertebrae. Steroids reduce inflammation and fluid buildup in your spine that may be causing pain. You may be given pain medicine along with the steroids.        DISCHARGE INSTRUCTIONS:   Call your local emergency number (911 in the [place]) if:   You have a seizure.    You have trouble moving your legs.    Seek care immediately if:   Blood soaks through your bandage.    You have a fever or chills, severe back pain, and the procedure area is sensitive to the touch.    You cannot control when you urinate or have a bowel movement.    Call your doctor if:   You have weakness or numbness in your legs.    Your wound is red, swollen, or draining pus.    You have nausea or are vomiting.    Your face or neck is red and you feel warm.    You have more pain than you had before the procedure.    You have swelling in your hands or feet.    You have questions or concerns about your condition or care.    Care for your wound as directed:  You may remove the bandage before you go to bed the day of your procedure. You may take a shower, but do not take a bath for at least 24 hours.   Self-care:   Do not drive,  use machines, or do strenuous activity for 24 hours after your procedure or as directed.     Continue other treatments  as directed. Steroid injections alone will not control your pain. The injections are meant to be used with other treatments, such as physical therapy.    Follow up with your doctor as directed:  Write down your questions so you remember to ask them during your visits.           ACTIVITY  Do not drive or operate machinery today.  No strenuous  activity today - bending, lifting, etc.   You may resume normal activities starting tomorrow - start slowly and as tolerated.  You may shower today, but not tub baths or hot tubs.  You may have numbness for several hours from the local anesthetics. Please use caution and common sense, especially with weight-bearing activities.    CARE OF THE INJECTION SITE  If you have soreness or pain apply ice to the area today (20 minutes on and 20 minutes off).  Starting tomorrow, you may resume normal activities  Notify the [facility] if you have any of the following: redness, drainage, swelling or fever above 100°F.      MEDICATIONS  Continue to take all routine medications.  Our office may have instructed you to hold some medications. You may restart medications, including blood thinners.

## 2024-11-13 ENCOUNTER — TELEPHONE (OUTPATIENT)
Dept: PAIN MEDICINE | Facility: CLINIC | Age: 64
End: 2024-11-13

## 2025-01-03 ENCOUNTER — OFFICE VISIT (OUTPATIENT)
Dept: PAIN MEDICINE | Facility: CLINIC | Age: 65
End: 2025-01-03
Payer: COMMERCIAL

## 2025-01-03 DIAGNOSIS — M47.26 OTHER SPONDYLOSIS WITH RADICULOPATHY, LUMBAR REGION: Primary | ICD-10-CM

## 2025-01-03 PROBLEM — M54.16 LUMBAR RADICULOPATHY: Status: RESOLVED | Noted: 2024-06-10 | Resolved: 2025-01-03

## 2025-01-03 PROCEDURE — 99214 OFFICE O/P EST MOD 30 MIN: CPT | Performed by: ANESTHESIOLOGY

## 2025-01-03 RX ORDER — PREGABALIN 25 MG/1
25 CAPSULE ORAL 3 TIMES DAILY
Qty: 90 CAPSULE | Refills: 2 | Status: SHIPPED | OUTPATIENT
Start: 2025-01-03

## 2025-01-03 NOTE — PATIENT INSTRUCTIONS
Patient Education     Core Strengthening Exercises on Back or on Hands and Knees   About this topic   Your core muscles are in your chest, back, buttock, and stomach area. They are your abdominal, back, and pelvis muscles. These muscles help keep your body stable when using your arms or legs. They also help with balance and posture. There are many exercises you can do to keep these muscles strong.  If you have back problems like a compression fracture or a ruptured disc, doing some of these exercises could make your problem worse. Some of these exercises may cause lower back pain.  General   Before starting with a program, ask your doctor if you are healthy enough to do these exercises. Your doctor may have you work with a , chiropractor, or physical therapist to make a safe exercise program to meet your needs.  Strengthening Exercises   Strengthening exercises keep your muscles firm and strong. Start by repeating each exercise 2 to 3 times. Work up to doing each exercise 10 times. Try to do the exercises 2 to 3 times each day. Hold each exercise for 3 to 5 seconds. Do all exercises slowly.  Hip lifts ? Lie on your back with your knees bent and feet flat on the floor. Tighten your stomach muscles and push your heels into the floor to lift your buttocks off the floor. Relax.  Pelvic tilts ? Lie on your back with your knees bent and feet flat on the floor. Tighten your stomach muscles and press your lower back down to the floor. Relax.  Straight leg raises lying down ? Lie on your back with one leg straight. Bend your other knee so the foot is flat on the bed. Keeping your leg straight, lift the leg up to the level of your other knee. Lower it back down. Repeat with the other leg.  Knee flex lying down ? Lie on your back with both knees bent and your feet flat on the floor. Tighten your belly muscles. Raise one leg up and back down as if you are marching in slow motion. Keep belly muscles tight while you move  your leg. Switch legs. To make this exercise harder, raise both arms straight up in the air. Tighten your belly muscles. When you raise one leg up, reach the opposite arm over your head. Switch, moving the opposite arm and leg until you have done 10 repetitions on each side.  Abdominal crunches ? Lie on your back with both knees bent. Keep your feet flat on the floor. Place your hands in one of these positions. Try starting with the first position since it is the easiest. As you get better, use the other positions to make it harder.  Crunches with arms at sides.  Crunches with arms across chest.  Crunches with arms behind head. Be careful not to interlock your fingers behind your neck or head while doing crunches. This may add tension to your neck and cause strain.  Look at the ceiling. Tighten your belly muscles and lift your shoulders and upper back off the floor. Breathe out while you are doing this. Lower your shoulders to the floor. Breathe in while you are doing this. Relax your belly muscles all the way before starting another crunch.  Arm and leg lifts on hands and knees ? Start on your hands and knees. With all of these exercises, keep your back as level as possible. If you are having trouble with this, you may want to put a small object on your back such as a book. If it falls off, you are not keeping your back level enough during the exercise.  Lift one arm up to shoulder level and hold. Lower it back down. Now, lift up the other arm and hold.  Lift one leg up and kick it straight out until it is in line with your back and hold. Lower it back down. Now, lift up the other leg and hold.  Lift one arm and the OPPOSITE leg up at the same time and hold. Lower them down. Now, repeat using the other arm and leg. This is a very hard exercise. It may take time to be able to do this.               What will the results be?   Stronger core  Better balance  More toned belly and back muscles  Easier to do daily  activities  Better sports performance  Less low back pain  Helpful tips   Stay active and work out to keep your muscles strong and flexible.  Keep a healthy weight to avoid putting too much stress on your spine. Eat a healthy diet to keep your muscles healthy.  Be sure you do not hold your breath when exercising. This can raise your blood pressure. If you tend to hold your breath, try counting out loud when exercising. If any exercise bothers you, stop right away.  Try walking or cycling at an easy pace for a few minutes to warm up your muscles. Do this again after exercising.  Exercise may be slightly uncomfortable, but you should not have sharp pains. If you do get sharp pains, stop what you are doing. If the sharp pains continue, call your doctor.  Last Reviewed Date   2021-03-18  Consumer Information Use and Disclaimer   This generalized information is a limited summary of diagnosis, treatment, and/or medication information. It is not meant to be comprehensive and should be used as a tool to help the user understand and/or assess potential diagnostic and treatment options. It does NOT include all information about conditions, treatments, medications, side effects, or risks that may apply to a specific patient. It is not intended to be medical advice or a substitute for the medical advice, diagnosis, or treatment of a health care provider based on the health care provider's examination and assessment of a patient’s specific and unique circumstances. Patients must speak with a health care provider for complete information about their health, medical questions, and treatment options, including any risks or benefits regarding use of medications. This information does not endorse any treatments or medications as safe, effective, or approved for treating a specific patient. [employer] and its affiliates disclaim any warranty or liability relating to this information or the use thereof. The use of this information  is governed by the Terms of Use, available at https://www.[employer]uwer.com/en/know/clinical-effectiveness-terms   Copyright   Copyright © 2024 [employer] and its affiliates and/or licensors. All rights reserved.

## 2025-01-03 NOTE — PROGRESS NOTES
Assessment  1. Other spondylosis with radiculopathy, lumbar region  -     pregabalin (LYRICA) 25 mg capsule; Take 1 capsule (25 mg total) by mouth 3 (three) times a day      MRI imaging extensively reviewed with patient. Advanced disc space narrowing C5-6 and C6-7 with vertebral body endplate spurring. Spondylosis of the lumbar spine resulting in mild spinal canal narrowing at L3-L4 and multilevel mild to moderate neuroforaminal narrowing most prominent on the left at L4-L5 and L5-S1. Encroachment of the left extraforaminal L3 nerve root due to disc protrusion. lumbar spine. Has had considerable benefit with physical therapy, had to stop taking gabapentin and lyrica secondary to sedation. Greater than 90% relief of pain with improved ability to participate with IADLs after ILESI at L4-L5 after no significant relief at left L3 and  L4 TFESI. Subsequent ILESI L4-L5 helped about 50% with symptoms thus far. Lyrica at 50mg TID was too sedating. Continues to report the following:     Left-sided lumbar radicular pain in the L3, L4  dermatomal distribution accompanied by pain limited weakness numbness and paresthesias.  Patient has not yet participated with PT. Chronic pain with decreased participation with IADLs over the past year.  Has been taking OTC ibuprofen and tylenol with modest benefit.  5/5 strength bilaterally, positive SLR left-sided. Reflexes 2+.  Additionally there is positive facet loading, left greater than right. Denies any gait instability, saddle anesthesia. No pertinent imaging available to review at this time.  Risks, benefits alternatives epidural steroid injections thoroughly discussed with patient.  Handouts provided questions answered to patient's satisfaction.  Lifestyle modifications extensively discussed including diet, exercise and weight loss in addition to core strengthening.  Will proceed with multimodal pain therapy plan as noted below:    Left sided cervical radicular pain in C6 and C7  dermatomal distribution accompanied by pain limited weakness numbness and paresthesias.  Patient has not been participant with PT. Chronic pain with decreased participation with IADLs over the past few years.  Has been taking NSAIDs and tramadol infrequently with modest benefit.  5/5 strength bilaterally in upper extremities with AROM, positive spurling's maneuver left sided.  Additionally there is positive cervical facet loading eliciting pain, left greater than right. Negative Diaz's, denies any gait instability, saddle anesthesia. No pertinent imaging available to review at this time. Risks, benefits alternatives to epidural steroid injections thoroughly discussed with patient.  Handouts provided questions answered to patient's satisfaction.  Lifestyle modifications extensively discussed including sleep hygiene, neck posture, diet, exercise and weight loss in conjunction with PT.  Will proceed with multimodal pain therapy plan as noted below:      Plan  -f/u prn to consider left L4 and L5 TFESI  -gabapentin transitioned to lyrica 25 mg t.i.d. for patient; counseled regarding sedative effects of taking this medication and provided up titration calendar.  Counseled not to take medication while driving or operating heavy machinery/using stairs  -ibuprofen prn pain previously prescribed.  Patient educated regarding bleeding risk of taking this medication as well as not taking any other nonsteroidal anti-inflammatory medications while taking this medication; counseled thoroughly regarding potential risk of Cardiovascular injury, Kidney injury, Gastrointestinal ulceration/bleeding. Patient voiced understanding  -has completed formal physical therapy for left-sided cervical and lumbar radiculopathy; Physician directed home exercise plan as per [facility] demonstrated and handouts provided that patient plans to participate with for 1 hour, twice a week for the next 6 weeks.     There are risks associated with opioid  medications, including dependence, addiction and tolerance. The patient understands and agrees to use these medications only as prescribed. Potential side effects of the medications include, but are not limited to, constipation, drowsiness, addiction, impaired judgment and risk of fatal overdose if not taken as prescribed. The patient was warned against driving while taking sedation medications or operating heavy machinery. The patient voiced understanding. Sharing medications is a felony. At this point in time, the patient is showing no signs of addiction, abuse, diversion or suicidal ideation.     [place] Prescription Drug Monitoring Program report was reviewed and was appropriate      Complete risks and benefits including bleeding, infection, tissue reaction, nerve injury and allergic reaction were discussed. The approach was demonstrated using models and literature was provided. Verbal and written consent was obtained.     My impressions and treatment recommendations were discussed in detail with the patient who verbalized understanding and had no further questions.  Discharge instructions were provided. I personally saw and examined the patient and I agree with the above discussed plan of care.    No orders of the defined types were placed in this encounter.      History of Present Illness    Greater than 90% relief of pain with improved ability to participate with IADLs after ILESI at L4-L5 after no significant relief at left L3 and  L4 TFESI. Has had considerable benefit with physical therapy, had to stop taking gabapentin secondary to sedation. Continues to report the following:     Darline Rodriguez is a 64 y.o. female with no significant pmhx presenting with presenting with chronic left sided lumbar radicular pain in the L3 and L4 dermatomal distributions. Debilitating pain limited weakness numbness and paresthesias accompany the pain. The patient rates the pain at a 8/10 accompanied by electric shock-like  shooting features and crampy burning pain in the aforementioned dermatomal distributions.  The pain is worse in the mornings as well as the end of the day; exertion such as walking for long periods of time seems to exacerbate the pain. The patient can hardly walk more than a few blocks without having debilitating pain.  She tries to maintain an active lifestyle and finds that the current degree of pain seems to compromises her efforts.  The pain significantly impacts independent activities of daily living and contributes to significant disability.  She has not yet attempted physical therapy.  She has taken ibuprofen, tylenol with limited relief of the pain. She has never tried epidural steroid injections in the past. She denies any bowel or bladder dysfunction/incontinence, saddle anesthesia or gait instability.    Additionally with left-sided neck pain described primarily as radicular nature.  The pain radiates in the C6 and C7 dermatomal distributions and is primarily left-sided.  The pain contributes to significant disability in participation with independent activities of daily living and is accompanied by weakness numbness and paresthesias that are debilitating in nature.  The patient describes that overhead maneuvers such as combing hair is significantly limiting with respect to strength in the left arm/hand. The patient notes significant weakness with left hand  as well. The patient has not been to physical therapy and is now transitioning to physical therapy.  She has trialed conservative measures including nsaids and tylenol for the pain but has not trialed any steroids.  Leighton never had interventional pain procedures in the past including any cervical interlaminar epidural steroid injections in the past for this pain. Denies any gait abnormality, saddle anesthesia or bowel/bladder abnormality.    I have personally reviewed and/or updated the patient's past medical history, past surgical history,  family history, social history, current medications, allergies, and vital signs today.     Review of Systems   Constitutional:  Positive for activity change.   HENT: Negative.     Eyes: Negative.    Respiratory: Negative.     Cardiovascular: Negative.    Gastrointestinal: Negative.    Endocrine: Negative.    Genitourinary: Negative.    Musculoskeletal:  Positive for arthralgias, back pain, gait problem, myalgias, neck pain and neck stiffness.   Skin: Negative.    Allergic/Immunologic: Negative.    Neurological:  Positive for tremors and numbness.   Hematological: Negative.    Psychiatric/Behavioral: Negative.     All other systems reviewed and are negative.      Patient Active Problem List   Diagnosis    Lumbar radiculopathy    Cervical radiculopathy       Past Medical History:   Diagnosis Date    Known health problems: none        Past Surgical History:   Procedure Laterality Date    CHOLECYSTECTOMY      IR SPINE AND PAIN PROCEDURE  7/11/2024    IR SPINE AND PAIN PROCEDURE  8/20/2024    IR SPINE AND PAIN PROCEDURE  11/12/2024    SKIN CANCER EXCISION      TONSILLECTOMY      TOTAL HIP ARTHROPLASTY         History reviewed. No pertinent family history.    Social History     Occupational History    Not on file   Tobacco Use    Smoking status: Never    Smokeless tobacco: Never   Vaping Use    Vaping status: Never Used   Substance and Sexual Activity    Alcohol use: Not Currently    Drug use: Never    Sexual activity: Not on file       Current Outpatient Medications on File Prior to Visit   Medication Sig    ibuprofen (MOTRIN) 200 mg tablet Advil 200 mg tablet   2 po q 4-6 hours prn pain    Omega-3 Fatty Acids (Super Omega 3 EPA/DHA) 1000 MG CAPS Take by mouth    Plant Sterols and Stanols (CHOLESTOFF PLUS PO)      No current facility-administered medications on file prior to visit.       Allergies   Allergen Reactions    Dicyclomine Swelling, Anaphylaxis and Hives    Celecoxib Headache     headdache         Physical  Exam    There were no vitals taken for this visit.    Constitutional: normal, well developed, well nourished, alert, in no distress and non-toxic and no overt pain behavior.  Eyes: anicteric  HEENT: grossly intact  Neck: supple, symmetric, trachea midline and no masses   Pulmonary:even and unlabored  Cardiovascular:No edema or pitting edema present  Skin:Normal without rashes or lesions and well hydrated  Psychiatric:Mood and affect appropriate  Neurologic:Cranial Nerves II-XII grossly intact Sensation grossly intact; no clonus negative baldwin's. Reflexes 2+ and brisk. SLR negative bilaterally. Spurling's maneuver negative bilaterally.  Musculoskeletal:normal gait. 5/5 strength bilaterally with AROM in all extremities. Difficulty with normal heel toe and tip toe walking. Significant pain with cervical and lumbar facet loading bilaterally and with lateral spine rotation, left sided. Ttp over cervical and lumbar paraspinal muscles. Negative gisel's test, negative gaenslen's negative SIJ loading bilaterally.    Imaging  MRI LUMBAR SPINE WITHOUT CONTRAST     INDICATION: M54.16: Radiculopathy, lumbar region.     COMPARISON: Lumbar spine radiographs from 4/15/2024     TECHNIQUE:  Multiplanar, multisequence imaging of the lumbar spine was performed. .        IMAGE QUALITY:  Diagnostic     FINDINGS:     VERTEBRAL BODIES:  There are 5 lumbar type vertebral bodies. Levoscoliosis of the lumbar spine centered at L2-L3. Minimal grade 1 anterolisthesis of L5 on S1. No compression fracture.     Prominent disc height loss at L3-L4 with endplate edema however no associated discal edema or surrounding inflammatory changes is favored to be degenerative. Osseous hemangioma within the posterior T10 vertebral body. No suspicious osseous lesions.     SACRUM:  Normal signal within the sacrum. No evidence of insufficiency or stress fracture.     DISTAL CORD AND CONUS:  Normal size and signal within the distal cord and conus.     PARASPINAL  SOFT TISSUES:  Paraspinal soft tissues are unremarkable.     LOWER THORACIC DISC SPACES:  No canal stenosis or foraminal narrowing.     LUMBAR DISC SPACES:     L1-L2: Small diffuse disc bulge and facet arthrosis. No spinal canal or significant foraminal narrowing.     L2-L3: Diffuse disc bulge, left foraminal disc protrusion and facet arthrosis. Minimal to mild spinal canal narrowing. No foraminal narrowing.     L3-L4: Prominent disc height loss, diffuse disc bulge, left foraminal/extraforaminal disc protrusion posterior osteophytic ridging, and facet arthrosis. Mild spinal canal narrowing. Bilateral subarticular recess narrowing, encroaching the right   descending L4 nerve root. Encroachment on the left extraforaminal L3 nerve root due to disc protrusion     L4-L5: Small diffuse disc bulge, ligamentum flavum thickening, central disc protrusion and facet arthrosis. No central canal narrowing. Mild left greater than right neuroforaminal narrowing.     L5-S1: Grade 1 anterolisthesis, diffuse disc bulge, central disc protrusion and facet arthrosis. Encroachment of the right descending S1 nerve root due to central protrusion. Mild right and mild to moderate left foraminal narrowing.     OTHER FINDINGS: Multiple T2 hyperintense hepatic lesions measuring up to 2.9 cm in the right hepatic lobe is incompletely evaluated.     IMPRESSION:     -Prominent disc height loss and edema within the endplates at L3-L4 is favored to be degenerative as detailed above and less likely infectious. Clinical correlation advised.     -Spondylosis of the lumbar spine resulting in mild spinal canal narrowing at L3-L4 and multilevel mild to moderate neuroforaminal narrowing most prominent on the left at L4-L5 and L5-S1. Encroachment of the left extraforaminal L3 nerve root due to disc   protrusion.     -Multiple T2 hyperintense hepatic lesions, incompletely evaluated and measuring up to 2.9 cm. Correlate with previous outside ultrasound of the  abdomen.    CERVICAL SPINE     INDICATION:   Radiculopathy, cervical region.      COMPARISON:  None.     VIEWS:  XR SPINE CERVICAL 2 OR 3 VW INJURY   Images: 3     FINDINGS:     No acute fracture or subluxation.      Overall reversal normal cervical lordosis with degenerative anterolisthesis C4-5 with stepwise retrolisthesis C5-6 and C6-7 and anterolisthesis C7-T1.     Advanced disc space narrowing C5-6 and C6-7 with vertebral body endplate spurring     Normal prevertebral soft tissues.       Clear lung apices.     IMPRESSION:        No acute osseous abnormality.     Degenerative changes as above.

## 2025-03-26 ENCOUNTER — OFFICE VISIT (OUTPATIENT)
Dept: PAIN MEDICINE | Facility: CLINIC | Age: 65
End: 2025-03-26
Payer: COMMERCIAL

## 2025-03-26 ENCOUNTER — PREP FOR PROCEDURE (OUTPATIENT)
Dept: PAIN MEDICINE | Facility: CLINIC | Age: 65
End: 2025-03-26

## 2025-03-26 VITALS — WEIGHT: 144 LBS | BODY MASS INDEX: 28.27 KG/M2 | HEIGHT: 60 IN

## 2025-03-26 DIAGNOSIS — M54.16 LUMBAR RADICULOPATHY: Primary | ICD-10-CM

## 2025-03-26 DIAGNOSIS — M47.26 OTHER SPONDYLOSIS WITH RADICULOPATHY, LUMBAR REGION: Primary | ICD-10-CM

## 2025-03-26 PROCEDURE — 99214 OFFICE O/P EST MOD 30 MIN: CPT | Performed by: ANESTHESIOLOGY

## 2025-03-26 PROCEDURE — G2211 COMPLEX E/M VISIT ADD ON: HCPCS | Performed by: ANESTHESIOLOGY

## 2025-03-26 RX ORDER — DULOXETIN HYDROCHLORIDE 30 MG/1
30 CAPSULE, DELAYED RELEASE ORAL DAILY
Qty: 30 CAPSULE | Refills: 2 | Status: SHIPPED | OUTPATIENT
Start: 2025-03-26

## 2025-03-26 NOTE — PATIENT INSTRUCTIONS
"Patient Education     Back exercises   The Basics   Written by the doctors and editors at [facility]   Why do I need to do back exercises? -- Back exercises can help ease back pain and might help prevent future back pain. Long term, it is important to strengthen the muscles in your lower back, buttocks, and belly. These are your \"core muscles.\" Stretching exercises are also important to keep your muscles flexible.  Below are some stretching and strengthening exercises that might help you. Other forms of movement can help ease or prevent back pain, too. For example, some people like to walk, do aerobic exercise, or do yoga or taina chi. The most important thing is to move your body. Your doctor, nurse, or physical therapist can help you find different types of activity that work for you.  What stretching exercises should I do? -- Below are some examples of stretching exercises. Warm up your muscles before stretching to help prevent injury. To warm up, you can walk, jog, or cycle for a few minutes.  Start by repeating each of these stretches 2 to 3 times. Try to hold each stretch for 5 to 10 seconds, and try to do the stretches 2 to 3 times each day. Breathe slowly and deeply as you do the exercises. Never bounce when doing stretches.   Cat-cow stretch (figure 1) - Start on all fours on the floor, with your hands under your shoulders, knees under your hips, and your back flat. First, tuck your chin and tighten your stomach muscles as you round your back (like a \"cat\"). Hold the stretch for about 10 seconds. Rest for a few seconds as you flatten your back. Next, lift your chin and let your belly and lower back sink toward the floor (like a \"cow\"). Hold the stretch for about 10 seconds.   Single knee-to-chest stretches (figure 2) ? While lying on your back, bend your knees with your feet flat on the floor. Pull 1 knee toward your chest until you feel a stretch in your lower back and buttock area. Lower, and repeat with the " other knee. If you have knee problems, pull your knee up by grabbing the back of your thigh instead of the front of your knee. You can also do this exercise by grabbing both knees at the same time.   Lower trunk rotations (figure 3) ? While lying on your back, bend your knees with your feet flat on the floor. Keep your knees and ankles together, and then drop them to 1 side. Keep both of your shoulders touching tPatient Education     Duloxetine (doo LOX e teen)   Brand Names: US Cymbalta; Drizalma Sprinkle [DSC]   Brand Names: Julia ACCEL-Duloxetine; AG-Duloxetine; APO-Duloxetine; Auro-Duloxetine; BIO-Duloxetine; Cymbalta; JAMP-Duloxetine; M-Duloxetine; Mar-Duloxetine; MINT-Duloxetine; NRA-Duloxetine; PMS-Duloxetine; PRIVA-Duloxetine [DSC]; RAN-Duloxetine; KATARZYNA-Duloxetine [DSC]; SANDOZ Duloxetine; TEVA-Duloxetine   Warning   Drugs like this one have raised the chance of suicidal thoughts or actions in children and young adults. The risk may be greater in people who have had these thoughts or actions in the past. All people who take this drug need to be watched closely. Call the doctor right away if signs like depression, nervousness, restlessness, grouchiness, panic attacks, or changes in mood or actions are new or worse. Call the doctor right away if any thoughts or actions of suicide occur.  What is this drug used for?   It is used to treat depression.  It is used to treat anxiety.  It is used to help painful nerve diseases and diabetic nerve problems.  It is used to ease long-term pain problems.  It is used to treat fibromyalgia.  It may be given to you for other reasons. Talk with the doctor.  What do I need to tell my doctor BEFORE I take this drug?   If you are allergic to this drug; any part of this drug; or any other drugs, foods, or substances. Tell your doctor about the allergy and what signs you had.  If you have any of these health problems: Kidney disease or liver disease.  If you are taking  thioridazine.  If you are taking any of these drugs: Ciprofloxacin or fluvoxamine.  If you are taking any of these drugs: Linezolid or methylene blue.  If you have taken certain drugs for depression or Parkinson's disease in the last 14 days. This includes isocarboxazid, phenelzine, tranylcypromine, selegiline, or rasagiline. Very high blood pressure may happen.  This is not a list of all drugs or health problems that interact with this drug.  Tell your doctor and pharmacist about all of your drugs (prescription or OTC, natural products, vitamins) and health problems. You must check to make sure that it is safe for you to take this drug with all of your drugs and health problems. Do not start, stop, or change the dose of any drug without checking with your doctor.  What are some things I need to know or do while I take this drug?   Tell all of your health care providers that you take this drug. This includes your doctors, nurses, pharmacists, and dentists.  Avoid driving and doing other tasks or actions that call for you to be alert until you see how this drug affects you.  To lower the chance of feeling dizzy or passing out, rise slowly if you have been sitting or lying down. Be careful going up and down stairs.  Low blood pressure, falls, and passing out have happened with this drug. Falls may lead to problems like broken bones and the need to go to the hospital. The chance of falling is raised with older people. Talk with the doctor.  If you have high blood sugar (diabetes), you will need to watch your blood sugar closely.  High blood pressure has happened with this drug. Have your blood pressure checked as you have been told by your doctor.  Talk with your doctor before you use alcohol, marijuana or other forms of cannabis, or prescription or OTC drugs that may slow your actions.  This drug may raise the chance of bleeding. Sometimes, bleeding can be life-threatening. Talk with the doctor.  A severe and  sometimes deadly problem called serotonin syndrome may happen. The risk may be greater if you also take certain other drugs. Call your doctor right away if you have agitation; change in balance; confusion; hallucinations; fever; fast or abnormal heartbeat; flushing; muscle twitching or stiffness; seizures; shivering or shaking; sweating a lot; severe diarrhea, upset stomach, or throwing up; or very bad headache.  Some people may have a higher chance of eye problems with this drug. Your doctor may want you to have an eye exam to see if you have a higher chance of these eye problems. Call your doctor right away if you have eye pain, change in eyesight, or swelling or redness in or around the eye.  This drug can cause low sodium levels. Very low sodium levels can be life-threatening, leading to seizures, passing out, trouble breathing, or death.  This drug may affect certain lab tests. Tell all of your health care providers and lab workers that you take this drug.  If you are 65 or older, use this drug with care. You could have more side effects.  This drug may affect growth in children and teens in some cases. They may need regular growth checks. Talk with the doctor.  Tell your doctor if you are pregnant, may be pregnant, or plan to get pregnant while taking this drug. You will need to talk about the benefits and risks to you and the baby. Taking this drug in the third trimester of pregnancy may raise your risk of bleeding after delivery and may lead to some health problems in the .  Tell your doctor if you are breast-feeding or plan to breast-feed. This drug passes into breast milk and may harm your baby.  What are some side effects that I need to call my doctor about right away?   WARNING/CAUTION: Even though it may be rare, some people may have very bad and sometimes deadly side effects when taking a drug. Tell your doctor or get medical help right away if you have any of the following signs or symptoms  that may be related to a very bad side effect:  Signs of an allergic reaction, like rash; hives; itching; red, swollen, blistered, or peeling skin with or without fever; wheezing; tightness in the chest or throat; trouble breathing, swallowing, or talking; unusual hoarseness; or swelling of the mouth, face, lips, tongue, or throat.  Signs of low sodium levels like headache, trouble focusing, memory problems, feeling confused, weakness, seizures, or change in balance.  Signs of bleeding like throwing up or coughing up blood; vomit that looks like coffee grounds; blood in the urine; black, red, or tarry stools; bleeding from the gums; abnormal vaginal bleeding; bruises without a cause or that get bigger; or bleeding you cannot stop.  Signs of high or low blood pressure like very bad headache or dizziness, passing out, or change in eyesight.  Seizures.  Trouble passing urine.  Sex problems have happened with drugs like this one. This includes lowered interest in sex, trouble having an orgasm, ejaculation problems, or trouble getting or keeping an erection. If you have any questions, talk with your doctor.  Liver problems have happened with this drug. Rarely, this has been deadly. Call your doctor right away if you have signs of liver problems like dark urine, tiredness, decreased appetite, upset stomach or stomach pain, light-colored stools, throwing up, or yellow skin or eyes.  A severe skin reaction (Donaldson-Delfino syndrome/toxic epidermal necrolysis) may happen. It can cause severe health problems that may not go away, and sometimes death. Get medical help right away if you have signs like red, swollen, blistered, or peeling skin (with or without fever); red or irritated eyes; or sores in your mouth, throat, nose, or eyes.  What are some other side effects of this drug?   All drugs may cause side effects. However, many people have no side effects or only have minor side effects. Call your doctor or get medical help  if any of these side effects or any other side effects bother you or do not go away:  Constipation, diarrhea, stomach pain, upset stomach, throwing up, or decreased appetite.  Headache.  Dry mouth.  Trouble sleeping.  Feeling dizzy, sleepy, tired, or weak.  Sweating a lot.  Weight loss.  Nose or throat irritation.  These are not all of the side effects that may occur. If you have questions about side effects, call your doctor. Call your doctor for medical advice about side effects.  You may report side effects to your national health agency.  You may report side effects to the FDA at 1-778.641.8301. You may also report side effects at https://www.fda.gov/medwatch.  How is this drug best taken?   Use this drug as ordered by your doctor. Read all information given to you. Follow all instructions closely.  All products:   Keep taking this drug as you have been told by your doctor or other health care provider, even if you feel well.  Take with or without food.  Do not stop taking this drug all of a sudden without calling your doctor. You may have a greater risk of side effects. If you need to stop this drug, you will want to slowly stop it as ordered by your doctor.  Capsules:   Swallow whole. Do not chew, open, or crush.  Sprinkle capsules :  Swallow whole. Do not chew or crush.  If you cannot swallow this drug whole, you may sprinkle the contents on applesauce. If you do this, swallow the mixture right away without chewing.  Those who have feeding tubes may use this drug. Use as you have been told. Flush the feeding tube after this drug is given.  What do I do if I miss a dose?   Take a missed dose as soon as you think about it.  If it is close to the time for your next dose, skip the missed dose and go back to your normal time.  Do not take 2 doses at the same time or extra doses.  How do I store and/or throw out this drug?   Store at room temperature in a dry place. Do not store in a bathroom.  Keep all drugs in a  safe place. Keep all drugs out of the reach of children and pets.  Throw away unused or  drugs. Do not flush down a toilet or pour down a drain unless you are told to do so. Check with your pharmacist if you have questions about the best way to throw out drugs. There may be drug take-back programs in your area.  General drug facts   If your symptoms or health problems do not get better or if they become worse, call your doctor.  Do not share your drugs with others and do not take anyone else's drugs.  Some drugs may have another patient information leaflet. If you have any questions about this drug, please talk with your doctor, nurse, pharmacist, or other health care provider.  This drug comes with an extra patient fact sheet called a Medication Guide. Read it with care. Read it again each time this drug is refilled. If you have any questions about this drug, please talk with the doctor, pharmacist, or other health care provider.  If you think there has been an overdose, call your poison control center or get medical care right away. Be ready to tell or show what was taken, how much, and when it happened.  Consumer Information Use and Disclaimer   This generalized information is a limited summary of diagnosis, treatment, and/or medication information. It is not meant to be comprehensive and should be used as a tool to help the user understand and/or assess potential diagnostic and treatment options. It does NOT include all information about conditions, treatments, medications, side effects, or risks that may apply to a specific patient. It is not intended to be medical advice or a substitute for the medical advice, diagnosis, or treatment of a health care provider based on the health care provider's examination and assessment of a patient's specific and unique circumstances. Patients must speak with a health care provider for complete information about their health, medical questions, and treatment options,  including any risks or benefits regarding use of medications. This information does not endorse any treatments or medications as safe, effective, or approved for treating a specific patient. Up[employer] Inc[employer] and its affiliates disclaim any warranty or liability relating to this information or the use thereof. The use of this information is governed by the Terms of Use, available at https://www.[employer].com/en/know/clinical-effectiveness-terms.  Last Reviewed Date   2023-09-05  Copyright   © 2024 Up[employer]Date, Inc. and its affiliates and/or licensors. All rights reserved.  he floor until you feel a stretch in the muscles at the side of the back. Repeat on the other side.   Lower back stretches seated (figure 4) ? Sit in a chair with your feet spread about shoulder width apart. Then, lean forward until you feel a stretch in your lower back.  What strengthening exercises should I do? -- Below are some examples of strengthening exercises.  Start by doing each exercise 2 to 3 times. Work up to doing each exercise 10 times. Hold each exercise for 3 to 5 seconds, and try to do the exercises 2 to 3 times each day. Do all exercises slowly.   Shoulder blade squeezes (figure 5) ? Pinch your shoulder blades together on your upper back, and hold 3 to 5 seconds. You can also do these 1 side at a time. Sit with good posture, and make sure that your shoulders do not rise up when you do this exercise. Relax.   Pelvic tilts (figure 6) ? Lie on your back with your knees bent and feet flat on the floor. Tighten your stomach muscles, and press your lower back down to the floor. Relax. You should be able to breathe comfortably during this exercise.   Hip lifts (figure 7) ? Lie on your back with your knees bent and feet flat on the floor. Tighten your stomach muscles, keep your back flat, and lift your buttocks off of the floor. Relax. You should feel this in your buttocks, not in your lower back.  What else should I know?    Exercise,  "including stretching, might be slightly uncomfortable. But you should not have sharp or severe pain. If you do get severe pain, stop what you are doing. If severe pain continues, call your doctor or nurse.   Do not hold your breath when exercising. If you tend to hold your breath, try counting out loud when exercising. If any exercise bothers you, stop right away.   Always warm up before exercising. Warm muscles stretch much easier than cool muscles. Stretching cool muscles can lead to injury.   Doing exercises before a meal can be a good way to get into a routine.  All topics are updated as new evidence becomes available and our peer review process is complete.  This topic retrieved from [employer] on: Apr 03, 2024.  Topic 659384 Version 2.0  Release: 32.2.4 - C32.92  © 2024 UpToDate, Inc. and/or its affiliates. All rights reserved.  figure 1: Cat-cow stretch     Start on all fours on the floor, with hands under your shoulders, knees under your hips, and your back flat. First, tuck your chin and tighten your stomach muscles as you round your back (like a \"cat\"). Hold the stretch for about 10 seconds. Rest for a few seconds as you flatten your back. Next, lift your chin and let your belly and lower back sink toward the floor (like a \"cow\"). Hold the stretch for about 10 seconds.  Graphic [id number] Version 1.0  figure 2: Single knee-to-chest stretch     Lie on your back, bend your knees, and have your feet flat on the floor. Pull 1 knee toward your chest until you feel a stretch in your lower back and buttock area. Repeat with the other knee. If you have knee problems, pull your knee up by grabbing the back of your thigh instead of the front of your knee. You can also do this exercise by grabbing both knees at the same time.  Graphic [id number] Version 1.0  figure 3: Lower trunk rotation     While lying on your back, bend your knees and have your feet flat on the floor. Keep your legs together and then drop them to 1 side. " Keep both of your shoulders touching the floor until you feel a stretch in the muscles at the side of the back. Repeat on the other side.  Graphic [id number] Version 1.0  figure 4: Lower back stretch     Sit in a chair with your feet spread about shoulder width apart. Then, lean forward until you feel a stretch in your lower back.  Graphic [id number] Version 1.0  figure 5: Shoulder blade squeezes     Pinch your shoulder blades together on your upper back and hold for 3 to 5 seconds. Make sure that you are sitting with good posture and that your shoulders do not raise up when you do this exercise. Relax.  Graphic [id number] Version 1.0  figure 6: Pelvic tilts     Lie on your back with your knees bent and feet flat on the floor. Tighten your stomach muscles and press your lower back down to the floor. Relax.  Graphic [id number] Version 1.0  figure 7: Hip lifts     Lie on your back with your knees bent and feet flat on the floor. Tighten your stomach muscles and lift your buttocks off of the floor. Relax.  Graphic [id number] Version 1.0  Consumer Information Use and Disclaimer   Disclaimer: This generalized information is a limited summary of diagnosis, treatment, and/or medication information. It is not meant to be comprehensive and should be used as a tool to help the user understand and/or assess potential diagnostic and treatment options. It does NOT include all information about conditions, treatments, medications, side effects, or risks that may apply to a specific patient. It is not intended to be medical advice or a substitute for the medical advice, diagnosis, or treatment of a health care provider based on the health care provider's examination and assessment of a patient's specific and unique circumstances. Patients must speak with a health care provider for complete information about their health, medical questions, and treatment options, including any risks or benefits regarding use of medications. This information does not  endorse any treatments or medications as safe, effective, or approved for treating a specific patient. Up[employer], Inc[employer] and its affiliates disclaim any warranty or liability relating to this information or the use thereof.The use of this information is governed by the Terms of Use, available at https://www.[employer].com/en/know/clinical-effectiveness-terms. 2024© UpToDate, Inc. and its affiliates and/or licensors. All rights reserved.  Copyright   © 2024 Up[employer]Date, Inc. and/or its affiliates. All rights reserved.

## 2025-03-26 NOTE — H&P (VIEW-ONLY)
Assessment  1. Other spondylosis with radiculopathy, lumbar region    MRI imaging extensively reviewed with patient. Advanced disc space narrowing C5-6 and C6-7 with vertebral body endplate spurring. Spondylosis of the lumbar spine resulting in mild spinal canal narrowing at L3-L4 and multilevel mild to moderate neuroforaminal narrowing most prominent on the left at L4-L5 and L5-S1. Encroachment of the left L5 and S1 nerve roots contributory to L5 and and S1 radicular features at this time. lumbar spine. Has had considerable benefit with physical therapy, had to stop taking gabapentin and lyrica secondary to sedation. Lyrica at 25mg TID was too sedating. Continues to report the following:     Left-sided lumbar radicular pain in the L5 and S1 dermatomal distribution accompanied by pain limited weakness numbness and paresthesias.  Patient has not yet participated with PT. Chronic pain with decreased participation with IADLs over the past year.  Has been taking OTC ibuprofen and tylenol with modest benefit.  5/5 strength bilaterally, positive SLR left-sided. Reflexes 2+.  Additionally there is positive facet loading, left greater than right. Denies any gait instability, saddle anesthesia.   Risks, benefits alternatives epidural steroid injections thoroughly discussed with patient.  Handouts provided questions answered to patient's satisfaction.  Lifestyle modifications extensively discussed including diet, exercise and weight loss in addition to core strengthening.  Will proceed with multimodal pain therapy plan as noted below:    Left sided cervical radicular pain in C6 and C7 dermatomal distribution accompanied by pain limited weakness numbness and paresthesias.  Patient has not been participant with PT. Chronic pain with decreased participation with IADLs over the past few years.  Has been taking NSAIDs and tramadol infrequently with modest benefit.  5/5 strength bilaterally in upper extremities with AROM,  positive spurling's maneuver left sided.  Additionally there is positive cervical facet loading eliciting pain, left greater than right. Negative Diaz's, denies any gait instability, saddle anesthesia. No pertinent imaging available to review at this time. Risks, benefits alternatives to epidural steroid injections thoroughly discussed with patient.  Handouts provided questions answered to patient's satisfaction.  Lifestyle modifications extensively discussed including sleep hygiene, neck posture, diet, exercise and weight loss in conjunction with PT.  Will proceed with multimodal pain therapy plan as noted below:      Plan  -Left L5 and S1 TFESI; f/u 2 weeks post proc  -lyrica transtioned to cymbalta 30mg/day for patient; counseled regarding sedative effects of taking this medication and provided up titration calendar.  Counseled not to take medication while driving or operating heavy machinery/using stairs  -ibuprofen prn pain previously prescribed.  Patient educated regarding bleeding risk of taking this medication as well as not taking any other nonsteroidal anti-inflammatory medications while taking this medication; counseled thoroughly regarding potential risk of Cardiovascular injury, Kidney injury, Gastrointestinal ulceration/bleeding. Patient voiced understanding  -has completed formal physical therapy for left-sided cervical and lumbar radiculopathy; Physician directed home exercise plan as per [facility] demonstrated and handouts provided that patient plans to participate with for 1 hour, twice a week for the next 6 weeks.     There are risks associated with opioid medications, including dependence, addiction and tolerance. The patient understands and agrees to use these medications only as prescribed. Potential side effects of the medications include, but are not limited to, constipation, drowsiness, addiction, impaired judgment and risk of fatal overdose if not taken as prescribed. The patient was warned  against driving while taking sedation medications or operating heavy machinery. The patient voiced understanding. Sharing medications is a felony. At this point in time, the patient is showing no signs of addiction, abuse, diversion or suicidal ideation.     [place] Prescription Drug Monitoring Program report was reviewed and was appropriate      Complete risks and benefits including bleeding, infection, tissue reaction, nerve injury and allergic reaction were discussed. The approach was demonstrated using models and literature was provided. Verbal and written consent was obtained.     My impressions and treatment recommendations were discussed in detail with the patient who verbalized understanding and had no further questions.  Discharge instructions were provided. I personally saw and examined the patient and I agree with the above discussed plan of care.    No orders of the defined types were placed in this encounter.      History of Present Illness    Greater than 90% relief of pain with improved ability to participate with IADLs after ILESI at L4-L5 after no significant relief at left L3 and  L4 TFESI. Has had considerable benefit with physical therapy, had to stop taking gabapentin secondary to sedation. Continues to report the following:     Darline Rodriguez is a 65 y.o. female with no significant pmhx presenting with presenting with chronic left sided lumbar radicular pain in the L3 and L4 dermatomal distributions. Debilitating pain limited weakness numbness and paresthesias accompany the pain. The patient rates the pain at a 8/10 accompanied by electric shock-like shooting features and crampy burning pain in the aforementioned dermatomal distributions.  The pain is worse in the mornings as well as the end of the day; exertion such as walking for long periods of time seems to exacerbate the pain. The patient can hardly walk more than a few blocks without having debilitating pain.  She tries to maintain an  active lifestyle and finds that the current degree of pain seems to compromises her efforts.  The pain significantly impacts independent activities of daily living and contributes to significant disability.  She has not yet attempted physical therapy.  She has taken ibuprofen, tylenol with limited relief of the pain. She has never tried epidural steroid injections in the past. She denies any bowel or bladder dysfunction/incontinence, saddle anesthesia or gait instability.    Additionally with left-sided neck pain described primarily as radicular nature.  The pain radiates in the C6 and C7 dermatomal distributions and is primarily left-sided.  The pain contributes to significant disability in participation with independent activities of daily living and is accompanied by weakness numbness and paresthesias that are debilitating in nature.  The patient describes that overhead maneuvers such as combing hair is significantly limiting with respect to strength in the left arm/hand. The patient notes significant weakness with left hand  as well. The patient has not been to physical therapy and is now transitioning to physical therapy.  She has trialed conservative measures including nsaids and tylenol for the pain but has not trialed any steroids.  Leighton never had interventional pain procedures in the past including any cervical interlaminar epidural steroid injections in the past for this pain. Denies any gait abnormality, saddle anesthesia or bowel/bladder abnormality.    I have personally reviewed and/or updated the patient's past medical history, past surgical history, family history, social history, current medications, allergies, and vital signs today.     Review of Systems   Constitutional:  Positive for activity change.   HENT: Negative.     Eyes: Negative.    Respiratory: Negative.     Cardiovascular: Negative.    Gastrointestinal: Negative.    Endocrine: Negative.    Genitourinary: Negative.     Musculoskeletal:  Positive for arthralgias, back pain, gait problem, myalgias, neck pain and neck stiffness.   Skin: Negative.    Allergic/Immunologic: Negative.    Neurological:  Positive for tremors and numbness.   Hematological: Negative.    Psychiatric/Behavioral: Negative.     All other systems reviewed and are negative.      Patient Active Problem List   Diagnosis    Cervical radiculopathy    Other spondylosis with radiculopathy, lumbar region       Past Medical History:   Diagnosis Date    Known health problems: none        Past Surgical History:   Procedure Laterality Date    CHOLECYSTECTOMY      IR SPINE AND PAIN PROCEDURE  7/11/2024    IR SPINE AND PAIN PROCEDURE  8/20/2024    IR SPINE AND PAIN PROCEDURE  11/12/2024    SKIN CANCER EXCISION      TONSILLECTOMY      TOTAL HIP ARTHROPLASTY         History reviewed. No pertinent family history.    Social History     Occupational History    Not on file   Tobacco Use    Smoking status: Never    Smokeless tobacco: Never   Vaping Use    Vaping status: Never Used   Substance and Sexual Activity    Alcohol use: Not Currently    Drug use: Never    Sexual activity: Not on file       Current Outpatient Medications on File Prior to Visit   Medication Sig    ibuprofen (MOTRIN) 200 mg tablet Advil 200 mg tablet   2 po q 4-6 hours prn pain    Omega-3 Fatty Acids (Super Omega 3 EPA/DHA) 1000 MG CAPS Take by mouth    Plant Sterols and Stanols (CHOLESTOFF PLUS PO)     [DISCONTINUED] pregabalin (LYRICA) 25 mg capsule Take 1 capsule (25 mg total) by mouth 3 (three) times a day (Patient taking differently: Take 25 mg by mouth 2 (two) times a day)     No current facility-administered medications on file prior to visit.       Allergies   Allergen Reactions    Dicyclomine Swelling, Anaphylaxis and Hives    Celecoxib Headache     headdache         Physical Exam    Ht 5' (1.524 m)   Wt 65.3 kg (144 lb)   BMI 28.12 kg/m²     Constitutional: normal, well developed, well nourished,  alert, in no distress and non-toxic and no overt pain behavior.  Eyes: anicteric  HEENT: grossly intact  Neck: supple, symmetric, trachea midline and no masses   Pulmonary:even and unlabored  Cardiovascular:No edema or pitting edema present  Skin:Normal without rashes or lesions and well hydrated  Psychiatric:Mood and affect appropriate  Neurologic:Cranial Nerves II-XII grossly intact Sensation grossly intact; no clonus negative baldwin's. Reflexes 2+ and brisk. SLR negative bilaterally. Spurling's maneuver negative bilaterally.  Musculoskeletal:normal gait. 5/5 strength bilaterally with AROM in all extremities. Difficulty with normal heel toe and tip toe walking. Significant pain with cervical and lumbar facet loading bilaterally and with lateral spine rotation, left sided. Ttp over cervical and lumbar paraspinal muscles. Negative gisel's test, negative gaenslen's negative SIJ loading bilaterally.    Imaging  MRI LUMBAR SPINE WITHOUT CONTRAST     INDICATION: M54.16: Radiculopathy, lumbar region.     COMPARISON: Lumbar spine radiographs from 4/15/2024     TECHNIQUE:  Multiplanar, multisequence imaging of the lumbar spine was performed. .        IMAGE QUALITY:  Diagnostic     FINDINGS:     VERTEBRAL BODIES:  There are 5 lumbar type vertebral bodies. Levoscoliosis of the lumbar spine centered at L2-L3. Minimal grade 1 anterolisthesis of L5 on S1. No compression fracture.     Prominent disc height loss at L3-L4 with endplate edema however no associated discal edema or surrounding inflammatory changes is favored to be degenerative. Osseous hemangioma within the posterior T10 vertebral body. No suspicious osseous lesions.     SACRUM:  Normal signal within the sacrum. No evidence of insufficiency or stress fracture.     DISTAL CORD AND CONUS:  Normal size and signal within the distal cord and conus.     PARASPINAL SOFT TISSUES:  Paraspinal soft tissues are unremarkable.     LOWER THORACIC DISC SPACES:  No canal stenosis  or foraminal narrowing.     LUMBAR DISC SPACES:     L1-L2: Small diffuse disc bulge and facet arthrosis. No spinal canal or significant foraminal narrowing.     L2-L3: Diffuse disc bulge, left foraminal disc protrusion and facet arthrosis. Minimal to mild spinal canal narrowing. No foraminal narrowing.     L3-L4: Prominent disc height loss, diffuse disc bulge, left foraminal/extraforaminal disc protrusion posterior osteophytic ridging, and facet arthrosis. Mild spinal canal narrowing. Bilateral subarticular recess narrowing, encroaching the right   descending L4 nerve root. Encroachment on the left extraforaminal L3 nerve root due to disc protrusion     L4-L5: Small diffuse disc bulge, ligamentum flavum thickening, central disc protrusion and facet arthrosis. No central canal narrowing. Mild left greater than right neuroforaminal narrowing.     L5-S1: Grade 1 anterolisthesis, diffuse disc bulge, central disc protrusion and facet arthrosis. Encroachment of the right descending S1 nerve root due to central protrusion. Mild right and mild to moderate left foraminal narrowing.     OTHER FINDINGS: Multiple T2 hyperintense hepatic lesions measuring up to 2.9 cm in the right hepatic lobe is incompletely evaluated.     IMPRESSION:     -Prominent disc height loss and edema within the endplates at L3-L4 is favored to be degenerative as detailed above and less likely infectious. Clinical correlation advised.     -Spondylosis of the lumbar spine resulting in mild spinal canal narrowing at L3-L4 and multilevel mild to moderate neuroforaminal narrowing most prominent on the left at L4-L5 and L5-S1. Encroachment of the left extraforaminal L3 nerve root due to disc   protrusion.     -Multiple T2 hyperintense hepatic lesions, incompletely evaluated and measuring up to 2.9 cm. Correlate with previous outside ultrasound of the abdomen.    CERVICAL SPINE     INDICATION:   Radiculopathy, cervical region.      COMPARISON:  None.      VIEWS:  XR SPINE CERVICAL 2 OR 3 VW INJURY   Images: 3     FINDINGS:     No acute fracture or subluxation.      Overall reversal normal cervical lordosis with degenerative anterolisthesis C4-5 with stepwise retrolisthesis C5-6 and C6-7 and anterolisthesis C7-T1.     Advanced disc space narrowing C5-6 and C6-7 with vertebral body endplate spurring     Normal prevertebral soft tissues.       Clear lung apices.     IMPRESSION:        No acute osseous abnormality.     Degenerative changes as above.

## 2025-04-01 ENCOUNTER — TELEPHONE (OUTPATIENT)
Dept: PAIN MEDICINE | Facility: CLINIC | Age: 65
End: 2025-04-01

## 2025-04-01 NOTE — TELEPHONE ENCOUNTER
Spoke to patient and I told her I would reach out if I had anyone cancel either 4/2 or 4/3 otherwise 4/8 is next procedure day. She understood and was fine with that.

## 2025-04-08 ENCOUNTER — HOSPITAL ENCOUNTER (OUTPATIENT)
Dept: INTERVENTIONAL RADIOLOGY/VASCULAR | Facility: HOSPITAL | Age: 65
Discharge: HOME | End: 2025-04-08
Attending: ANESTHESIOLOGY
Payer: COMMERCIAL

## 2025-04-08 VITALS
DIASTOLIC BLOOD PRESSURE: 64 MMHG | RESPIRATION RATE: 18 BRPM | HEIGHT: 60 IN | SYSTOLIC BLOOD PRESSURE: 134 MMHG | TEMPERATURE: 98.1 F | HEART RATE: 78 BPM | BODY MASS INDEX: 27.09 KG/M2 | OXYGEN SATURATION: 97 % | WEIGHT: 138 LBS

## 2025-04-08 DIAGNOSIS — M54.16 LUMBAR RADICULOPATHY: ICD-10-CM

## 2025-04-08 PROCEDURE — 64484 NJX AA&/STRD TFRM EPI L/S EA: CPT | Performed by: ANESTHESIOLOGY

## 2025-04-08 PROCEDURE — 64483 NJX AA&/STRD TFRM EPI L/S 1: CPT | Performed by: ANESTHESIOLOGY

## 2025-04-08 RX ORDER — BETAMETHASONE SODIUM PHOSPHATE AND BETAMETHASONE ACETATE 3; 3 MG/ML; MG/ML
INJECTION, SUSPENSION INTRA-ARTICULAR; INTRALESIONAL; INTRAMUSCULAR; SOFT TISSUE AS NEEDED
Status: COMPLETED | OUTPATIENT
Start: 2025-04-08 | End: 2025-04-08

## 2025-04-08 RX ORDER — LIDOCAINE HYDROCHLORIDE 10 MG/ML
INJECTION, SOLUTION EPIDURAL; INFILTRATION; INTRACAUDAL; PERINEURAL AS NEEDED
Status: COMPLETED | OUTPATIENT
Start: 2025-04-08 | End: 2025-04-08

## 2025-04-08 RX ADMIN — BETAMETHASONE SODIUM PHOSPHATE AND BETAMETHASONE ACETATE 30 MG: 3; 3 INJECTION, SUSPENSION INTRA-ARTICULAR; INTRALESIONAL; INTRAMUSCULAR at 09:50

## 2025-04-08 RX ADMIN — IOHEXOL 2 ML: 240 INJECTION, SOLUTION INTRATHECAL; INTRAVASCULAR; INTRAVENOUS; ORAL at 09:50

## 2025-04-08 RX ADMIN — LIDOCAINE HYDROCHLORIDE 10 ML: 10 INJECTION, SOLUTION EPIDURAL; INFILTRATION; INTRACAUDAL; PERINEURAL at 09:48

## 2025-04-08 NOTE — INTERVAL H&P NOTE
H&P reviewed. After examining the patient I find no changes in the patients condition since the H&P had been written.    Vitals:    04/08/25 0920   BP: 152/70   Pulse: 86   Resp: 18   Temp: 97.8 °F (36.6 °C)   SpO2: 97%

## 2025-04-08 NOTE — DISCHARGE INSTR - AVS FIRST PAGE
YOUR 2 WEEK FOLLOW UP HAS BEEN SCHEDULED; IF YOU WISH TO CHANGE THE FOLLOW UP, PLEASE CALL THE [facility] AT [facility]: [phone]    EPIDURAL STEROID INJECTION DISCHARGE INSTRUCTIONS  WHAT YOU NEED TO KNOW:   An epidural steroid injection (SARAH) is a procedure to inject steroid medicine into the epidural space. The epidural space is between your spinal cord and vertebrae. Steroids reduce inflammation and fluid buildup in your spine that may be causing pain. You may be given pain medicine along with the steroids.        DISCHARGE INSTRUCTIONS:   Call your local emergency number (911 in the [place]) if:   You have a seizure.    You have trouble moving your legs.    Seek care immediately if:   Blood soaks through your bandage.    You have a fever or chills, severe back pain, and the procedure area is sensitive to the touch.    You cannot control when you urinate or have a bowel movement.    Call your doctor if:   You have weakness or numbness in your legs.    Your wound is red, swollen, or draining pus.    You have nausea or are vomiting.    Your face or neck is red and you feel warm.    You have more pain than you had before the procedure.    You have swelling in your hands or feet.    You have questions or concerns about your condition or care.    Care for your wound as directed:  You may remove the bandage before you go to bed the day of your procedure. You may take a shower, but do not take a bath for at least 24 hours.   Self-care:   Do not drive,  use machines, or do strenuous activity for 24 hours after your procedure or as directed.     Continue other treatments  as directed. Steroid injections alone will not control your pain. The injections are meant to be used with other treatments, such as physical therapy.    Follow up with your doctor as directed:  Write down your questions so you remember to ask them during your visits.           ACTIVITY  Do not drive or operate machinery today.  No strenuous  activity today - bending, lifting, etc.   You may resume normal activities starting tomorrow - start slowly and as tolerated.  You may shower today, but not tub baths or hot tubs.  You may have numbness for several hours from the local anesthetics. Please use caution and common sense, especially with weight-bearing activities.    CARE OF THE INJECTION SITE  If you have soreness or pain apply ice to the area today (20 minutes on and 20 minutes off).  Starting tomorrow, you may resume normal activities  Notify the [facility] if you have any of the following: redness, drainage, swelling or fever above 100°F.      MEDICATIONS  Continue to take all routine medications.  Our office may have instructed you to hold some medications. You may restart medications, including blood thinners.

## 2025-04-17 DIAGNOSIS — M47.26 OTHER SPONDYLOSIS WITH RADICULOPATHY, LUMBAR REGION: ICD-10-CM

## 2025-04-17 RX ORDER — DULOXETIN HYDROCHLORIDE 30 MG/1
30 CAPSULE, DELAYED RELEASE ORAL DAILY
Qty: 90 CAPSULE | Refills: 1 | Status: SHIPPED | OUTPATIENT
Start: 2025-04-17

## 2025-04-28 ENCOUNTER — OFFICE VISIT (OUTPATIENT)
Dept: PAIN MEDICINE | Facility: CLINIC | Age: 65
End: 2025-04-28
Payer: COMMERCIAL

## 2025-04-28 VITALS — HEIGHT: 60 IN | BODY MASS INDEX: 28.07 KG/M2 | WEIGHT: 143 LBS

## 2025-04-28 DIAGNOSIS — M47.26 OTHER SPONDYLOSIS WITH RADICULOPATHY, LUMBAR REGION: Primary | ICD-10-CM

## 2025-04-28 PROCEDURE — G2211 COMPLEX E/M VISIT ADD ON: HCPCS | Performed by: ANESTHESIOLOGY

## 2025-04-28 PROCEDURE — 99213 OFFICE O/P EST LOW 20 MIN: CPT | Performed by: ANESTHESIOLOGY

## 2025-04-28 NOTE — PATIENT INSTRUCTIONS
"Patient Education     Back exercises   The Basics   Written by the doctors and editors at [facility]   Why do I need to do back exercises? -- Back exercises can help ease back pain and might help prevent future back pain. Long term, it is important to strengthen the muscles in your lower back, buttocks, and belly. These are your \"core muscles.\" Stretching exercises are also important to keep your muscles flexible.  Below are some stretching and strengthening exercises that might help you. Other forms of movement can help ease or prevent back pain, too. For example, some people like to walk, do aerobic exercise, or do yoga or taina chi. The most important thing is to move your body. Your doctor, nurse, or physical therapist can help you find different types of activity that work for you.  What stretching exercises should I do? -- Below are some examples of stretching exercises. Warm up your muscles before stretching to help prevent injury. To warm up, you can walk, jog, or cycle for a few minutes.  Start by repeating each of these stretches 2 to 3 times. Try to hold each stretch for 5 to 10 seconds, and try to do the stretches 2 to 3 times each day. Breathe slowly and deeply as you do the exercises. Never bounce when doing stretches.   Cat-cow stretch (figure 1) - Start on all fours on the floor, with your hands under your shoulders, knees under your hips, and your back flat. First, tuck your chin and tighten your stomach muscles as you round your back (like a \"cat\"). Hold the stretch for about 10 seconds. Rest for a few seconds as you flatten your back. Next, lift your chin and let your belly and lower back sink toward the floor (like a \"cow\"). Hold the stretch for about 10 seconds.   Single knee-to-chest stretches (figure 2) ? While lying on your back, bend your knees with your feet flat on the floor. Pull 1 knee toward your chest until you feel a stretch in your lower back and buttock area. Lower, and repeat with the " other knee. If you have knee problems, pull your knee up by grabbing the back of your thigh instead of the front of your knee. You can also do this exercise by grabbing both knees at the same time.   Lower trunk rotations (figure 3) ? While lying on your back, bend your knees with your feet flat on the floor. Keep your knees and ankles together, and then drop them to 1 side. Keep both of your shoulders touching the floor until you feel a stretch in the muscles at the side of the back. Repeat on the other side.   Lower back stretches seated (figure 4) ? Sit in a chair with your feet spread about shoulder width apart. Then, lean forward until you feel a stretch in your lower back.  What strengthening exercises should I do? -- Below are some examples of strengthening exercises.  Start by doing each exercise 2 to 3 times. Work up to doing each exercise 10 times. Hold each exercise for 3 to 5 seconds, and try to do the exercises 2 to 3 times each day. Do all exercises slowly.   Shoulder blade squeezes (figure 5) ? Pinch your shoulder blades together on your upper back, and hold 3 to 5 seconds. You can also do these 1 side at a time. Sit with good posture, and make sure that your shoulders do not rise up when you do this exercise. Relax.   Pelvic tilts (figure 6) ? Lie on your back with your knees bent and feet flat on the floor. Tighten your stomach muscles, and press your lower back down to the floor. Relax. You should be able to breathe comfortably during this exercise.   Hip lifts (figure 7) ? Lie on your back with your knees bent and feet flat on the floor. Tighten your stomach muscles, keep your back flat, and lift your buttocks off of the floor. Relax. You should feel this in your buttocks, not in your lower back.  What else should I know?    Exercise, including stretching, might be slightly uncomfortable. But you should not have sharp or severe pain. If you do get severe pain, stop what you are doing. If severe  "pain continues, call your doctor or nurse.   Do not hold your breath when exercising. If you tend to hold your breath, try counting out loud when exercising. If any exercise bothers you, stop right away.   Always warm up before exercising. Warm muscles stretch much easier than cool muscles. Stretching cool muscles can lead to injury.   Doing exercises before a meal can be a good way to get into a routine.  All topics are updated as new evidence becomes available and our peer review process is complete.  This topic retrieved from [employer] on: Apr 03, 2024.  Topic 201087 Version 2.0  Release: 32.2.4 - C32.92  © 2024 UpToDate, Inc. and/or its affiliates. All rights reserved.  figure 1: Cat-cow stretch     Start on all fours on the floor, with hands under your shoulders, knees under your hips, and your back flat. First, tuck your chin and tighten your stomach muscles as you round your back (like a \"cat\"). Hold the stretch for about 10 seconds. Rest for a few seconds as you flatten your back. Next, lift your chin and let your belly and lower back sink toward the floor (like a \"cow\"). Hold the stretch for about 10 seconds.  Graphic [id number] Version 1.0  figure 2: Single knee-to-chest stretch     Lie on your back, bend your knees, and have your feet flat on the floor. Pull 1 knee toward your chest until you feel a stretch in your lower back and buttock area. Repeat with the other knee. If you have knee problems, pull your knee up by grabbing the back of your thigh instead of the front of your knee. You can also do this exercise by grabbing both knees at the same time.  Graphic [id number] Version 1.0  figure 3: Lower trunk rotation     While lying on your back, bend your knees and have your feet flat on the floor. Keep your legs together and then drop them to 1 side. Keep both of your shoulders touching the floor until you feel a stretch in the muscles at the side of the back. Repeat on the other side.  Graphic [id number] Version " 1.0  figure 4: Lower back stretch     Sit in a chair with your feet spread about shoulder width apart. Then, lean forward until you feel a stretch in your lower back.  Graphic [id number] Version 1.0  figure 5: Shoulder blade squeezes     Pinch your shoulder blades together on your upper back and hold for 3 to 5 seconds. Make sure that you are sitting with good posture and that your shoulders do not raise up when you do this exercise. Relax.  Graphic [id number] Version 1.0  figure 6: Pelvic tilts     Lie on your back with your knees bent and feet flat on the floor. Tighten your stomach muscles and press your lower back down to the floor. Relax.  Graphic [id number] Version 1.0  figure 7: Hip lifts     Lie on your back with your knees bent and feet flat on the floor. Tighten your stomach muscles and lift your buttocks off of the floor. Relax.  Graphic [id number] Version 1.0  Consumer Information Use and Disclaimer   Disclaimer: This generalized information is a limited summary of diagnosis, treatment, and/or medication information. It is not meant to be comprehensive and should be used as a tool to help the user understand and/or assess potential diagnostic and treatment options. It does NOT include all information about conditions, treatments, medications, side effects, or risks that may apply to a specific patient. It is not intended to be medical advice or a substitute for the medical advice, diagnosis, or treatment of a health care provider based on the health care provider's examination and assessment of a patient's specific and unique circumstances. Patients must speak with a health care provider for complete information about their health, medical questions, and treatment options, including any risks or benefits regarding use of medications. This information does not endorse any treatments or medications as safe, effective, or approved for treating a specific patient. UpToDate, Inc. and its affiliates disclaim any warranty or  liability relating to this information or the use thereof.The use of this information is governed by the Terms of Use, available at https://www.wolterskluwer.com/en/know/clinical-effectiveness-terms. 2024© UpToDate, Inc. and its affiliates and/or licensors. All rights reserved.  Copyright   © 2024 [employer] and/or its affiliates. All rights reserved.

## 2025-04-28 NOTE — PROGRESS NOTES
Assessment  1. Other spondylosis with radiculopathy, lumbar region    MRI imaging extensively reviewed with patient. Advanced disc space narrowing C5-6 and C6-7 with vertebral body endplate spurring. Spondylosis of the lumbar spine resulting in mild spinal canal narrowing at L3-L4 and multilevel mild to moderate neuroforaminal narrowing most prominent on the left at L4-L5 and L5-S1. Encroachment of the left L5 and S1 nerve roots contributory to L5 and and S1 radicular features which responded well by at least 80% relief with recent left L5 and S1 TFESI. Has had considerable benefit with physical therapy, had to stop taking gabapentin and lyrica secondary to sedation. Cymbalta at current dosing impovming pain without side effects by at least 30% without side effects. Continues to report the following:     Left-sided lumbar radicular pain in the L5 and S1 dermatomal distribution accompanied by pain limited weakness numbness and paresthesias.  Patient has not yet participated with PT. Chronic pain with decreased participation with IADLs over the past year.  Has been taking OTC ibuprofen and tylenol with modest benefit.  5/5 strength bilaterally, positive SLR left-sided. Reflexes 2+.  Additionally there is positive facet loading, left greater than right. Denies any gait instability, saddle anesthesia.   Risks, benefits alternatives epidural steroid injections thoroughly discussed with patient.  Handouts provided questions answered to patient's satisfaction.  Lifestyle modifications extensively discussed including diet, exercise and weight loss in addition to core strengthening.  Will proceed with multimodal pain therapy plan as noted below:    Left sided cervical radicular pain in C6 and C7 dermatomal distribution accompanied by pain limited weakness numbness and paresthesias.  Patient has not been participant with PT. Chronic pain with decreased participation with IADLs over the past few years.  Has been taking  NSAIDs and tramadol infrequently with modest benefit.  5/5 strength bilaterally in upper extremities with AROM, positive spurling's maneuver left sided.  Additionally there is positive cervical facet loading eliciting pain, left greater than right. Negative Diaz's, denies any gait instability, saddle anesthesia. No pertinent imaging available to review at this time. Risks, benefits alternatives to epidural steroid injections thoroughly discussed with patient.  Handouts provided questions answered to patient's satisfaction.  Lifestyle modifications extensively discussed including sleep hygiene, neck posture, diet, exercise and weight loss in conjunction with PT.  Will proceed with multimodal pain therapy plan as noted below:      Plan  -f/u prn  -cymbalta 30mg/day ordered for patient; counseled regarding sedative effects of taking this medication and provided up titration calendar.  Counseled not to take medication while driving or operating heavy machinery/using stairs  -ibuprofen prn pain previously prescribed.  Patient educated regarding bleeding risk of taking this medication as well as not taking any other nonsteroidal anti-inflammatory medications while taking this medication; counseled thoroughly regarding potential risk of Cardiovascular injury, Kidney injury, Gastrointestinal ulceration/bleeding. Patient voiced understanding  -has completed formal physical therapy for left-sided cervical and lumbar radiculopathy; Physician directed home exercise plan as per [facility] demonstrated and handouts provided that patient plans to participate with for 1 hour, twice a week for the next 6 weeks.     There are risks associated with opioid medications, including dependence, addiction and tolerance. The patient understands and agrees to use these medications only as prescribed. Potential side effects of the medications include, but are not limited to, constipation, drowsiness, addiction, impaired judgment and risk of fatal  overdose if not taken as prescribed. The patient was warned against driving while taking sedation medications or operating heavy machinery. The patient voiced understanding. Sharing medications is a felony. At this point in time, the patient is showing no signs of addiction, abuse, diversion or suicidal ideation.     [place] Prescription Drug Monitoring Program report was reviewed and was appropriate      Complete risks and benefits including bleeding, infection, tissue reaction, nerve injury and allergic reaction were discussed. The approach was demonstrated using models and literature was provided. Verbal and written consent was obtained.     My impressions and treatment recommendations were discussed in detail with the patient who verbalized understanding and had no further questions.  Discharge instructions were provided. I personally saw and examined the patient and I agree with the above discussed plan of care.    No orders of the defined types were placed in this encounter.      History of Present Illness    Darline Rodriguez is a 65 y.o. female with no significant pmhx presenting with presenting with chronic left sided lumbar radicular pain in the L3 and L4 dermatomal distributions. Debilitating pain limited weakness numbness and paresthesias accompany the pain. The patient rates the pain at a 8/10 accompanied by electric shock-like shooting features and crampy burning pain in the aforementioned dermatomal distributions.  The pain is worse in the mornings as well as the end of the day; exertion such as walking for long periods of time seems to exacerbate the pain. The patient can hardly walk more than a few blocks without having debilitating pain.  She tries to maintain an active lifestyle and finds that the current degree of pain seems to compromises her efforts.  The pain significantly impacts independent activities of daily living and contributes to significant disability.  She has not yet attempted  physical therapy.  She has taken ibuprofen, tylenol with limited relief of the pain. She has never tried epidural steroid injections in the past. She denies any bowel or bladder dysfunction/incontinence, saddle anesthesia or gait instability.    Additionally with left-sided neck pain described primarily as radicular nature.  The pain radiates in the C6 and C7 dermatomal distributions and is primarily left-sided.  The pain contributes to significant disability in participation with independent activities of daily living and is accompanied by weakness numbness and paresthesias that are debilitating in nature.  The patient describes that overhead maneuvers such as combing hair is significantly limiting with respect to strength in the left arm/hand. The patient notes significant weakness with left hand  as well. The patient has not been to physical therapy and is now transitioning to physical therapy.  She has trialed conservative measures including nsaids and tylenol for the pain but has not trialed any steroids.  Leighton never had interventional pain procedures in the past including any cervical interlaminar epidural steroid injections in the past for this pain. Denies any gait abnormality, saddle anesthesia or bowel/bladder abnormality.    I have personally reviewed and/or updated the patient's past medical history, past surgical history, family history, social history, current medications, allergies, and vital signs today.     Review of Systems   Constitutional:  Positive for activity change.   HENT: Negative.     Eyes: Negative.    Respiratory: Negative.     Cardiovascular: Negative.    Gastrointestinal: Negative.    Endocrine: Negative.    Genitourinary: Negative.    Musculoskeletal:  Positive for arthralgias, back pain, gait problem, myalgias, neck pain and neck stiffness.   Skin: Negative.    Allergic/Immunologic: Negative.    Neurological:  Positive for tremors and numbness.   Hematological: Negative.     Psychiatric/Behavioral: Negative.     All other systems reviewed and are negative.      Patient Active Problem List   Diagnosis    Cervical radiculopathy    Other spondylosis with radiculopathy, lumbar region       Past Medical History:   Diagnosis Date    Arthritis 01/01/2016    Cancer (HCC) 1/01/2010    Melanoma, once on arm, once on back    Headache(784.0) 01/01/1973    Known health problems: none        Past Surgical History:   Procedure Laterality Date    CHOLECYSTECTOMY      IR SPINE AND PAIN PROCEDURE  07/11/2024    IR SPINE AND PAIN PROCEDURE  08/20/2024    IR SPINE AND PAIN PROCEDURE  11/12/2024    IR SPINE AND PAIN PROCEDURE  4/8/2025    JOINT REPLACEMENT  05/13/2013    right hip    ORTHOPEDIC SURGERY  05/13/2013    hip replaced    SKIN CANCER EXCISION      TONSILLECTOMY      TOTAL HIP ARTHROPLASTY         History reviewed. No pertinent family history.    Social History     Occupational History    Not on file   Tobacco Use    Smoking status: Never    Smokeless tobacco: Never   Vaping Use    Vaping status: Never Used   Substance and Sexual Activity    Alcohol use: Not Currently    Drug use: Never    Sexual activity: Not on file       Current Outpatient Medications on File Prior to Visit   Medication Sig    DULoxetine (CYMBALTA) 30 mg delayed release capsule TAKE 1 CAPSULE BY MOUTH EVERY DAY    ibuprofen (MOTRIN) 200 mg tablet Advil 200 mg tablet   2 po q 4-6 hours prn pain    Omega-3 Fatty Acids (Super Omega 3 EPA/DHA) 1000 MG CAPS Take by mouth    Plant Sterols and Stanols (CHOLESTOFF PLUS PO)      No current facility-administered medications on file prior to visit.       Allergies   Allergen Reactions    Dicyclomine Swelling, Anaphylaxis and Hives    Celecoxib Headache     headdache         Physical Exam    Ht 5' (1.524 m)   Wt 64.9 kg (143 lb)   BMI 27.93 kg/m²     Constitutional: normal, well developed, well nourished, alert, in no distress and non-toxic and no overt pain behavior.  Eyes:  anicteric  HEENT: grossly intact  Neck: supple, symmetric, trachea midline and no masses   Pulmonary:even and unlabored  Cardiovascular:No edema or pitting edema present  Skin:Normal without rashes or lesions and well hydrated  Psychiatric:Mood and affect appropriate  Neurologic:Cranial Nerves II-XII grossly intact Sensation grossly intact; no clonus negative baldwin's. Reflexes 2+ and brisk. SLR negative bilaterally. Spurling's maneuver negative bilaterally.  Musculoskeletal:normal gait. 5/5 strength bilaterally with AROM in all extremities. Difficulty with normal heel toe and tip toe walking. Significant pain with cervical and lumbar facet loading bilaterally and with lateral spine rotation, left sided. Ttp over cervical and lumbar paraspinal muscles. Negative gisel's test, negative gaenslen's negative SIJ loading bilaterally.    Imaging  MRI LUMBAR SPINE WITHOUT CONTRAST     INDICATION: M54.16: Radiculopathy, lumbar region.     COMPARISON: Lumbar spine radiographs from 4/15/2024     TECHNIQUE:  Multiplanar, multisequence imaging of the lumbar spine was performed. .        IMAGE QUALITY:  Diagnostic     FINDINGS:     VERTEBRAL BODIES:  There are 5 lumbar type vertebral bodies. Levoscoliosis of the lumbar spine centered at L2-L3. Minimal grade 1 anterolisthesis of L5 on S1. No compression fracture.     Prominent disc height loss at L3-L4 with endplate edema however no associated discal edema or surrounding inflammatory changes is favored to be degenerative. Osseous hemangioma within the posterior T10 vertebral body. No suspicious osseous lesions.     SACRUM:  Normal signal within the sacrum. No evidence of insufficiency or stress fracture.     DISTAL CORD AND CONUS:  Normal size and signal within the distal cord and conus.     PARASPINAL SOFT TISSUES:  Paraspinal soft tissues are unremarkable.     LOWER THORACIC DISC SPACES:  No canal stenosis or foraminal narrowing.     LUMBAR DISC SPACES:     L1-L2: Small diffuse  disc bulge and facet arthrosis. No spinal canal or significant foraminal narrowing.     L2-L3: Diffuse disc bulge, left foraminal disc protrusion and facet arthrosis. Minimal to mild spinal canal narrowing. No foraminal narrowing.     L3-L4: Prominent disc height loss, diffuse disc bulge, left foraminal/extraforaminal disc protrusion posterior osteophytic ridging, and facet arthrosis. Mild spinal canal narrowing. Bilateral subarticular recess narrowing, encroaching the right   descending L4 nerve root. Encroachment on the left extraforaminal L3 nerve root due to disc protrusion     L4-L5: Small diffuse disc bulge, ligamentum flavum thickening, central disc protrusion and facet arthrosis. No central canal narrowing. Mild left greater than right neuroforaminal narrowing.     L5-S1: Grade 1 anterolisthesis, diffuse disc bulge, central disc protrusion and facet arthrosis. Encroachment of the right descending S1 nerve root due to central protrusion. Mild right and mild to moderate left foraminal narrowing.     OTHER FINDINGS: Multiple T2 hyperintense hepatic lesions measuring up to 2.9 cm in the right hepatic lobe is incompletely evaluated.     IMPRESSION:     -Prominent disc height loss and edema within the endplates at L3-L4 is favored to be degenerative as detailed above and less likely infectious. Clinical correlation advised.     -Spondylosis of the lumbar spine resulting in mild spinal canal narrowing at L3-L4 and multilevel mild to moderate neuroforaminal narrowing most prominent on the left at L4-L5 and L5-S1. Encroachment of the left extraforaminal L3 nerve root due to disc   protrusion.     -Multiple T2 hyperintense hepatic lesions, incompletely evaluated and measuring up to 2.9 cm. Correlate with previous outside ultrasound of the abdomen.    CERVICAL SPINE     INDICATION:   Radiculopathy, cervical region.      COMPARISON:  None.     VIEWS:  XR SPINE CERVICAL 2 OR 3 VW INJURY   Images: 3     FINDINGS:     No  acute fracture or subluxation.      Overall reversal normal cervical lordosis with degenerative anterolisthesis C4-5 with stepwise retrolisthesis C5-6 and C6-7 and anterolisthesis C7-T1.     Advanced disc space narrowing C5-6 and C6-7 with vertebral body endplate spurring     Normal prevertebral soft tissues.       Clear lung apices.     IMPRESSION:        No acute osseous abnormality.     Degenerative changes as above.

## 2025-07-17 ENCOUNTER — TELEPHONE (OUTPATIENT)
Dept: PAIN MEDICINE | Facility: CLINIC | Age: 65
End: 2025-07-17

## 2025-07-17 ENCOUNTER — PREP FOR PROCEDURE (OUTPATIENT)
Dept: PAIN MEDICINE | Facility: CLINIC | Age: 65
End: 2025-07-17

## 2025-07-17 DIAGNOSIS — F41.9 ANXIETY: Primary | ICD-10-CM

## 2025-07-17 DIAGNOSIS — M54.16 LUMBAR RADICULOPATHY: Primary | ICD-10-CM

## 2025-07-17 RX ORDER — ALPRAZOLAM 0.5 MG
0.5 TABLET ORAL
Qty: 2 TABLET | Refills: 0 | Status: SHIPPED | OUTPATIENT
Start: 2025-07-17

## 2025-07-18 NOTE — TELEPHONE ENCOUNTER
----- Message from Amelie HERNANDEZ sent at 7/17/2025  2:40 PM EDT -----  Regarding: RE: repeat procedure  PLEASE SEND ORDER FOR XANAX  ----- Message -----  From: Destin Willingham MD  Sent: 7/17/2025   1:23 PM EDT  To: Amelie Farah  Subject: RE: repeat procedure                             Can schedule repeat thanks  ----- Message -----  From: Amelie Farah  Sent: 7/17/2025   1:15 PM EDT  To: Destin Willingham MD  Subject: repeat procedure                                 Patient called stated her pain is same area as before, would like inj again. Is it too soon or ok to schedule

## 2025-07-29 ENCOUNTER — TELEPHONE (OUTPATIENT)
Dept: PAIN MEDICINE | Facility: CLINIC | Age: 65
End: 2025-07-29

## 2025-07-31 ENCOUNTER — PREP FOR PROCEDURE (OUTPATIENT)
Dept: PAIN MEDICINE | Facility: CLINIC | Age: 65
End: 2025-07-31

## 2025-07-31 DIAGNOSIS — M54.16 LUMBAR RADICULOPATHY: Primary | ICD-10-CM

## 2025-08-05 ENCOUNTER — HOSPITAL ENCOUNTER (OUTPATIENT)
Dept: INTERVENTIONAL RADIOLOGY/VASCULAR | Facility: HOSPITAL | Age: 65
Discharge: HOME | End: 2025-08-05
Attending: ANESTHESIOLOGY | Admitting: ANESTHESIOLOGY
Payer: COMMERCIAL

## 2025-08-05 VITALS
HEIGHT: 60 IN | HEART RATE: 87 BPM | RESPIRATION RATE: 20 BRPM | DIASTOLIC BLOOD PRESSURE: 62 MMHG | BODY MASS INDEX: 28.07 KG/M2 | TEMPERATURE: 98 F | SYSTOLIC BLOOD PRESSURE: 118 MMHG | OXYGEN SATURATION: 100 % | WEIGHT: 143 LBS

## 2025-08-05 DIAGNOSIS — M54.16 LUMBAR RADICULOPATHY: ICD-10-CM

## 2025-08-05 PROCEDURE — 62323 NJX INTERLAMINAR LMBR/SAC: CPT | Performed by: ANESTHESIOLOGY

## 2025-08-05 RX ORDER — LIDOCAINE HYDROCHLORIDE 10 MG/ML
INJECTION, SOLUTION EPIDURAL; INFILTRATION; INTRACAUDAL; PERINEURAL AS NEEDED
Status: COMPLETED | OUTPATIENT
Start: 2025-08-05 | End: 2025-08-05

## 2025-08-05 RX ORDER — 0.9 % SODIUM CHLORIDE 0.9 %
VIAL (ML) INJECTION AS NEEDED
Status: COMPLETED | OUTPATIENT
Start: 2025-08-05 | End: 2025-08-05

## 2025-08-05 RX ORDER — METHYLPREDNISOLONE ACETATE 80 MG/ML
INJECTION, SUSPENSION INTRA-ARTICULAR; INTRALESIONAL; INTRAMUSCULAR; PARENTERAL; SOFT TISSUE AS NEEDED
Status: COMPLETED | OUTPATIENT
Start: 2025-08-05 | End: 2025-08-05

## 2025-08-05 RX ADMIN — LIDOCAINE HYDROCHLORIDE 5 ML: 10 INJECTION, SOLUTION EPIDURAL; INFILTRATION; INTRACAUDAL; PERINEURAL at 13:48

## 2025-08-05 RX ADMIN — METHYLPREDNISOLONE ACETATE 80 MG: 80 INJECTION, SUSPENSION INTRA-ARTICULAR; INTRALESIONAL; INTRAMUSCULAR; SOFT TISSUE at 13:48

## 2025-08-05 RX ADMIN — IOHEXOL 2 ML: 240 INJECTION, SOLUTION INTRATHECAL; INTRAVASCULAR; INTRAVENOUS; ORAL at 13:48

## 2025-08-05 RX ADMIN — SODIUM CHLORIDE 9 ML: 9 INJECTION INTRAMUSCULAR; INTRAVENOUS; SUBCUTANEOUS at 13:48
